# Patient Record
Sex: MALE | Race: WHITE | NOT HISPANIC OR LATINO | Employment: OTHER | URBAN - METROPOLITAN AREA
[De-identification: names, ages, dates, MRNs, and addresses within clinical notes are randomized per-mention and may not be internally consistent; named-entity substitution may affect disease eponyms.]

---

## 2017-01-30 ENCOUNTER — ANESTHESIA EVENT (OUTPATIENT)
Dept: PERIOP | Facility: HOSPITAL | Age: 82
End: 2017-01-30
Payer: MEDICARE

## 2017-01-31 ENCOUNTER — ANESTHESIA (OUTPATIENT)
Dept: PERIOP | Facility: HOSPITAL | Age: 82
End: 2017-01-31
Payer: MEDICARE

## 2017-01-31 ENCOUNTER — HOSPITAL ENCOUNTER (OUTPATIENT)
Facility: HOSPITAL | Age: 82
Setting detail: OUTPATIENT SURGERY
Discharge: HOME | End: 2017-01-31
Attending: UROLOGY | Admitting: UROLOGY
Payer: MEDICARE

## 2017-01-31 VITALS
TEMPERATURE: 97.8 F | OXYGEN SATURATION: 96 % | HEART RATE: 58 BPM | RESPIRATION RATE: 18 BRPM | SYSTOLIC BLOOD PRESSURE: 152 MMHG | DIASTOLIC BLOOD PRESSURE: 71 MMHG

## 2017-01-31 RX ORDER — DEXAMETHASONE SODIUM PHOSPHATE 4 MG/ML
INJECTION, SOLUTION INTRA-ARTICULAR; INTRALESIONAL; INTRAMUSCULAR; INTRAVENOUS; SOFT TISSUE AS NEEDED
Status: DISCONTINUED | OUTPATIENT
Start: 2017-01-31 | End: 2017-01-31 | Stop reason: SURG

## 2017-01-31 RX ORDER — GLYCINE 1.5 G/100ML
SOLUTION IRRIGATION AS NEEDED
Status: DISCONTINUED | OUTPATIENT
Start: 2017-01-31 | End: 2017-01-31 | Stop reason: HOSPADM

## 2017-01-31 RX ORDER — ONDANSETRON 2 MG/ML
4 INJECTION INTRAMUSCULAR; INTRAVENOUS ONCE
Status: DISCONTINUED | OUTPATIENT
Start: 2017-01-31 | End: 2017-01-31 | Stop reason: HOSPADM

## 2017-01-31 RX ORDER — LIDOCAINE HYDROCHLORIDE 20 MG/ML
INJECTION, SOLUTION EPIDURAL; INFILTRATION; INTRACAUDAL; PERINEURAL AS NEEDED
Status: DISCONTINUED | OUTPATIENT
Start: 2017-01-31 | End: 2017-01-31 | Stop reason: SURG

## 2017-01-31 RX ORDER — MAGNESIUM HYDROXIDE 1200 MG/15ML
LIQUID ORAL AS NEEDED
Status: DISCONTINUED | OUTPATIENT
Start: 2017-01-31 | End: 2017-01-31 | Stop reason: HOSPADM

## 2017-01-31 RX ORDER — ONDANSETRON 2 MG/ML
INJECTION INTRAMUSCULAR; INTRAVENOUS AS NEEDED
Status: DISCONTINUED | OUTPATIENT
Start: 2017-01-31 | End: 2017-01-31 | Stop reason: SURG

## 2017-01-31 RX ORDER — PROPOFOL 10 MG/ML
INJECTION, EMULSION INTRAVENOUS AS NEEDED
Status: DISCONTINUED | OUTPATIENT
Start: 2017-01-31 | End: 2017-01-31 | Stop reason: SURG

## 2017-01-31 RX ORDER — SODIUM CHLORIDE, SODIUM LACTATE, POTASSIUM CHLORIDE, CALCIUM CHLORIDE 600; 310; 30; 20 MG/100ML; MG/100ML; MG/100ML; MG/100ML
100 INJECTION, SOLUTION INTRAVENOUS CONTINUOUS
Status: DISCONTINUED | OUTPATIENT
Start: 2017-01-31 | End: 2017-01-31 | Stop reason: HOSPADM

## 2017-01-31 RX ORDER — FENTANYL CITRATE 50 UG/ML
INJECTION, SOLUTION INTRAMUSCULAR; INTRAVENOUS AS NEEDED
Status: DISCONTINUED | OUTPATIENT
Start: 2017-01-31 | End: 2017-01-31 | Stop reason: SURG

## 2017-01-31 RX ORDER — METOPROLOL SUCCINATE 25 MG/1
25 TABLET, EXTENDED RELEASE ORAL DAILY
Status: DISCONTINUED | OUTPATIENT
Start: 2017-01-31 | End: 2017-01-31 | Stop reason: HOSPADM

## 2017-01-31 RX ORDER — FENTANYL CITRATE/PF 50 MCG/ML
50 SYRINGE (ML) INJECTION
Status: DISCONTINUED | OUTPATIENT
Start: 2017-01-31 | End: 2017-01-31 | Stop reason: HOSPADM

## 2017-01-31 RX ADMIN — FENTANYL CITRATE 50 MCG: 50 INJECTION, SOLUTION INTRAMUSCULAR; INTRAVENOUS at 07:42

## 2017-01-31 RX ADMIN — DEXAMETHASONE SODIUM PHOSPHATE 4 MG: 4 INJECTION, SOLUTION INTRA-ARTICULAR; INTRALESIONAL; INTRAMUSCULAR; INTRAVENOUS; SOFT TISSUE at 07:52

## 2017-01-31 RX ADMIN — SODIUM CHLORIDE, SODIUM LACTATE, POTASSIUM CHLORIDE, AND CALCIUM CHLORIDE: .6; .31; .03; .02 INJECTION, SOLUTION INTRAVENOUS at 07:38

## 2017-01-31 RX ADMIN — PROPOFOL 150 MG: 10 INJECTION, EMULSION INTRAVENOUS at 07:46

## 2017-01-31 RX ADMIN — FENTANYL CITRATE 50 MCG: 50 INJECTION, SOLUTION INTRAMUSCULAR; INTRAVENOUS at 07:46

## 2017-01-31 RX ADMIN — ONDANSETRON 4 MG: 2 INJECTION INTRAMUSCULAR; INTRAVENOUS at 08:18

## 2017-01-31 RX ADMIN — LIDOCAINE HYDROCHLORIDE 40 MG: 20 INJECTION, SOLUTION EPIDURAL; INFILTRATION; INTRACAUDAL; PERINEURAL at 07:46

## 2017-01-31 RX ADMIN — CEFAZOLIN SODIUM 1000 MG: 1 SOLUTION INTRAVENOUS at 07:41

## 2017-07-19 ENCOUNTER — TRANSCRIBE ORDERS (OUTPATIENT)
Dept: ADMINISTRATIVE | Facility: HOSPITAL | Age: 82
End: 2017-07-19

## 2017-07-19 ENCOUNTER — APPOINTMENT (OUTPATIENT)
Dept: LAB | Facility: HOSPITAL | Age: 82
End: 2017-07-19
Attending: UROLOGY
Payer: MEDICARE

## 2017-07-19 ENCOUNTER — APPOINTMENT (OUTPATIENT)
Dept: PREADMISSION TESTING | Facility: HOSPITAL | Age: 82
End: 2017-07-19
Payer: MEDICARE

## 2017-07-19 ENCOUNTER — OFFICE VISIT (OUTPATIENT)
Dept: LAB | Facility: HOSPITAL | Age: 82
End: 2017-07-19
Attending: UROLOGY
Payer: MEDICARE

## 2017-07-19 VITALS — BODY MASS INDEX: 29.73 KG/M2 | WEIGHT: 185 LBS | HEIGHT: 66 IN

## 2017-07-19 DIAGNOSIS — Z01.818 PRE-OP TESTING: ICD-10-CM

## 2017-07-19 DIAGNOSIS — Z01.818 PRE-OP TESTING: Primary | ICD-10-CM

## 2017-07-19 LAB
ALBUMIN SERPL BCP-MCNC: 3.8 G/DL (ref 3.5–5)
ALP SERPL-CCNC: 81 U/L (ref 46–116)
ALT SERPL W P-5'-P-CCNC: 23 U/L (ref 12–78)
ANION GAP SERPL CALCULATED.3IONS-SCNC: 9 MMOL/L (ref 4–13)
AST SERPL W P-5'-P-CCNC: 23 U/L (ref 5–45)
BASOPHILS # BLD AUTO: 0 THOUSANDS/ΜL (ref 0–0.1)
BASOPHILS NFR BLD AUTO: 0 % (ref 0–1)
BILIRUB SERPL-MCNC: 0.6 MG/DL (ref 0.2–1)
BILIRUB UR QL STRIP: NEGATIVE
BUN SERPL-MCNC: 22 MG/DL (ref 5–25)
CALCIUM SERPL-MCNC: 9 MG/DL (ref 8.3–10.1)
CHLORIDE SERPL-SCNC: 103 MMOL/L (ref 100–108)
CLARITY UR: CLEAR
CO2 SERPL-SCNC: 28 MMOL/L (ref 21–32)
COLOR UR: YELLOW
CREAT SERPL-MCNC: 1.42 MG/DL (ref 0.6–1.3)
EOSINOPHIL # BLD AUTO: 0.7 THOUSAND/ΜL (ref 0–0.61)
EOSINOPHIL NFR BLD AUTO: 12 % (ref 0–6)
ERYTHROCYTE [DISTWIDTH] IN BLOOD BY AUTOMATED COUNT: 13.4 % (ref 11.6–15.1)
GFR SERPL CREATININE-BSD FRML MDRD: 47.3 ML/MIN/1.73SQ M
GLUCOSE P FAST SERPL-MCNC: 98 MG/DL (ref 65–99)
GLUCOSE UR STRIP-MCNC: NEGATIVE MG/DL
HCT VFR BLD AUTO: 39.3 % (ref 42–52)
HGB BLD-MCNC: 13 G/DL (ref 14–18)
HGB UR QL STRIP.AUTO: NEGATIVE
KETONES UR STRIP-MCNC: NEGATIVE MG/DL
LEUKOCYTE ESTERASE UR QL STRIP: NEGATIVE
LYMPHOCYTES # BLD AUTO: 0.9 THOUSANDS/ΜL (ref 0.6–4.47)
LYMPHOCYTES NFR BLD AUTO: 16 % (ref 14–44)
MCH RBC QN AUTO: 31.4 PG (ref 27–31)
MCHC RBC AUTO-ENTMCNC: 33.2 G/DL (ref 31.4–37.4)
MCV RBC AUTO: 95 FL (ref 82–98)
MONOCYTES # BLD AUTO: 0.5 THOUSAND/ΜL (ref 0.17–1.22)
MONOCYTES NFR BLD AUTO: 9 % (ref 4–12)
NEUTROPHILS # BLD AUTO: 3.6 THOUSANDS/ΜL (ref 1.85–7.62)
NEUTS SEG NFR BLD AUTO: 63 % (ref 43–75)
NITRITE UR QL STRIP: NEGATIVE
NRBC BLD AUTO-RTO: 0 /100 WBCS
PH UR STRIP.AUTO: 7.5 [PH] (ref 5–9)
PLATELET # BLD AUTO: 121 THOUSANDS/UL (ref 130–400)
PMV BLD AUTO: 9.3 FL (ref 8.9–12.7)
POTASSIUM SERPL-SCNC: 4.4 MMOL/L (ref 3.5–5.3)
PROT SERPL-MCNC: 7.6 G/DL (ref 6.4–8.2)
PROT UR STRIP-MCNC: NEGATIVE MG/DL
RBC # BLD AUTO: 4.16 MILLION/UL (ref 4.7–6.1)
SODIUM SERPL-SCNC: 140 MMOL/L (ref 136–145)
SP GR UR STRIP.AUTO: 1.01 (ref 1–1.03)
UROBILINOGEN UR QL STRIP.AUTO: 0.2 E.U./DL
WBC # BLD AUTO: 5.7 THOUSAND/UL (ref 4.8–10.8)

## 2017-07-19 PROCEDURE — 87086 URINE CULTURE/COLONY COUNT: CPT

## 2017-07-19 PROCEDURE — 85025 COMPLETE CBC W/AUTO DIFF WBC: CPT | Performed by: UROLOGY

## 2017-07-19 PROCEDURE — 36415 COLL VENOUS BLD VENIPUNCTURE: CPT | Performed by: UROLOGY

## 2017-07-19 PROCEDURE — 80053 COMPREHEN METABOLIC PANEL: CPT | Performed by: UROLOGY

## 2017-07-19 PROCEDURE — 93005 ELECTROCARDIOGRAM TRACING: CPT

## 2017-07-19 PROCEDURE — 81003 URINALYSIS AUTO W/O SCOPE: CPT | Performed by: UROLOGY

## 2017-07-19 RX ORDER — BRIMONIDINE TARTRATE, TIMOLOL MALEATE 2; 5 MG/ML; MG/ML
1 SOLUTION/ DROPS OPHTHALMIC EVERY 12 HOURS SCHEDULED
COMMUNITY
End: 2019-07-24

## 2017-07-20 LAB
ATRIAL RATE: 57 BPM
BACTERIA UR CULT: NORMAL
P AXIS: 65 DEGREES
PR INTERVAL: 188 MS
QRS AXIS: 25 DEGREES
QRSD INTERVAL: 78 MS
QT INTERVAL: 438 MS
QTC INTERVAL: 426 MS
T WAVE AXIS: 84 DEGREES
VENTRICULAR RATE: 57 BPM

## 2017-07-24 ENCOUNTER — ANESTHESIA EVENT (OUTPATIENT)
Dept: PERIOP | Facility: HOSPITAL | Age: 82
End: 2017-07-24
Payer: MEDICARE

## 2017-07-25 ENCOUNTER — ANESTHESIA (OUTPATIENT)
Dept: PERIOP | Facility: HOSPITAL | Age: 82
End: 2017-07-25
Payer: MEDICARE

## 2017-07-25 ENCOUNTER — HOSPITAL ENCOUNTER (OUTPATIENT)
Facility: HOSPITAL | Age: 82
Setting detail: OUTPATIENT SURGERY
Discharge: HOME | End: 2017-07-25
Attending: UROLOGY | Admitting: UROLOGY
Payer: MEDICARE

## 2017-07-25 VITALS
DIASTOLIC BLOOD PRESSURE: 78 MMHG | HEART RATE: 58 BPM | RESPIRATION RATE: 16 BRPM | TEMPERATURE: 97.6 F | OXYGEN SATURATION: 97 % | SYSTOLIC BLOOD PRESSURE: 166 MMHG

## 2017-07-25 PROBLEM — C67.8 MALIGNANT NEOPLASM OF OVERLAPPING SITES OF BLADDER (HCC): Status: ACTIVE | Noted: 2017-07-25

## 2017-07-25 RX ORDER — GLYCINE 1.5 G/100ML
SOLUTION IRRIGATION AS NEEDED
Status: DISCONTINUED | OUTPATIENT
Start: 2017-07-25 | End: 2017-07-25 | Stop reason: HOSPADM

## 2017-07-25 RX ORDER — FENTANYL CITRATE 50 UG/ML
INJECTION, SOLUTION INTRAMUSCULAR; INTRAVENOUS AS NEEDED
Status: DISCONTINUED | OUTPATIENT
Start: 2017-07-25 | End: 2017-07-25 | Stop reason: SURG

## 2017-07-25 RX ORDER — SODIUM CHLORIDE, SODIUM LACTATE, POTASSIUM CHLORIDE, CALCIUM CHLORIDE 600; 310; 30; 20 MG/100ML; MG/100ML; MG/100ML; MG/100ML
75 INJECTION, SOLUTION INTRAVENOUS CONTINUOUS
Status: DISCONTINUED | OUTPATIENT
Start: 2017-07-25 | End: 2017-07-25 | Stop reason: HOSPADM

## 2017-07-25 RX ORDER — FENTANYL CITRATE/PF 50 MCG/ML
25 SYRINGE (ML) INJECTION
Status: DISCONTINUED | OUTPATIENT
Start: 2017-07-25 | End: 2017-07-25 | Stop reason: HOSPADM

## 2017-07-25 RX ORDER — PROPOFOL 10 MG/ML
INJECTION, EMULSION INTRAVENOUS AS NEEDED
Status: DISCONTINUED | OUTPATIENT
Start: 2017-07-25 | End: 2017-07-25 | Stop reason: SURG

## 2017-07-25 RX ORDER — ONDANSETRON 2 MG/ML
4 INJECTION INTRAMUSCULAR; INTRAVENOUS ONCE AS NEEDED
Status: DISCONTINUED | OUTPATIENT
Start: 2017-07-25 | End: 2017-07-25 | Stop reason: HOSPADM

## 2017-07-25 RX ORDER — CEFOXITIN 1 G/1
INJECTION, POWDER, FOR SOLUTION INTRAVENOUS AS NEEDED
Status: DISCONTINUED | OUTPATIENT
Start: 2017-07-25 | End: 2017-07-25 | Stop reason: SURG

## 2017-07-25 RX ORDER — SODIUM CHLORIDE, SODIUM LACTATE, POTASSIUM CHLORIDE, CALCIUM CHLORIDE 600; 310; 30; 20 MG/100ML; MG/100ML; MG/100ML; MG/100ML
100 INJECTION, SOLUTION INTRAVENOUS CONTINUOUS
Status: DISCONTINUED | OUTPATIENT
Start: 2017-07-25 | End: 2017-07-25 | Stop reason: HOSPADM

## 2017-07-25 RX ADMIN — FENTANYL CITRATE 50 MCG: 50 INJECTION, SOLUTION INTRAMUSCULAR; INTRAVENOUS at 07:46

## 2017-07-25 RX ADMIN — FENTANYL CITRATE 50 MCG: 50 INJECTION, SOLUTION INTRAMUSCULAR; INTRAVENOUS at 07:55

## 2017-07-25 RX ADMIN — FENTANYL CITRATE 50 MCG: 50 INJECTION, SOLUTION INTRAMUSCULAR; INTRAVENOUS at 07:50

## 2017-07-25 RX ADMIN — CEFOXITIN SODIUM 1000 MG: 1 POWDER, FOR SOLUTION INTRAVENOUS at 07:38

## 2017-07-25 RX ADMIN — SODIUM CHLORIDE, SODIUM LACTATE, POTASSIUM CHLORIDE, AND CALCIUM CHLORIDE 75 ML/HR: .6; .31; .03; .02 INJECTION, SOLUTION INTRAVENOUS at 07:07

## 2017-07-25 RX ADMIN — PROPOFOL 200 MG: 10 INJECTION, EMULSION INTRAVENOUS at 07:44

## 2017-07-25 RX ADMIN — SODIUM CHLORIDE, SODIUM LACTATE, POTASSIUM CHLORIDE, AND CALCIUM CHLORIDE 100 ML/HR: .6; .31; .03; .02 INJECTION, SOLUTION INTRAVENOUS at 09:59

## 2018-07-25 NOTE — H&P
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  Cystoscopy 7/23/18 indicated bladder neck and trigone reoccurrence  Large and flat  Prior BCG and mitomycin reaction   · Transurethral resection of the bladder tumors and instillation of epirubicin  · The risks, benefits, alternatives,and probabilities of success were discussed in detail with no guarantee made as to outcome  All questions were answered to the patient's satisfaction  · Two hour dwell time  HISTORY OF PRESENT ILLNESS:  81 y/o male with a history of recurrent urothelial carcinoma of his bladder and urethra recently underwent cystoscopy indicating large flat tumors  Presents for surgical resection of the lesions with subsequent bladder instillation with epirubicin       PAST MEDICAL HISTORY:  Past Medical History:   Diagnosis Date    Arthritis     Bladder cancer [facility])     also has prostate ca, skin ca    Blind right eye     S/P cataract surgery-2/15    [facility] )     Bladder, prostate and skin    COPD (chronic obstructive pulmonary disease) ([facility] )     Coronary artery disease     CABG, Right carotid endarterectomy    Eye problem     developed post op complicationsafter cataract extraction 2/2016-had 2 procedures in [facility] but not able to save the vision, uses  an eye patch at night         ight    Glaucoma     Hearing difficulty of both ears     bilateral hearing aids    Hyperlipidemia     Hypertension     Macular degeneration     Myocardial infarction 2003    CABG x 4    Polymyalgia ([facility] )        PAST SURGICAL HISTORY:  Past Surgical History:   Procedure Laterality Date    ACHILLES TENDON REPAIR Left     BLADDER INSTILLATION      numerous procedures    CARDIAC SURGERY  2003    CABG x 4    CAROTID ENDARTARECTOMY Right 2001    CATARACT EXTRACTION      aliyah w/ IOL  EYE SURGERY Right     x2 -S/P cataract surgery- now blind in the right eye    FRACTURE SURGERY Right 01/2014    wrist    HEMORRHOID SURGERY      AR CYSTOURETHROSCOPY,FULGUR 2-5 CM LESN N/A 7/25/2017    Procedure: TRANSURETHRAL RESECTION OF BLADDER TUMOR (TURBT), BLADDER INSTILLATION;  Surgeon: Azael Vital MD;  Location: [address] [place] [address];  Service: Urology    AR INSTILL ANTICANCER AGENT IN BLADDER N/A 7/25/2017    Procedure: INSTILLATION EPIRUBICIN;  Surgeon: Azael Vital MD;  Location: [address] [place] [address];  Service: Urology    AR [address] [place] [address] Right 2/16/2016    Procedure: EXTRACTION EXTRACAPSULAR CATARACT PHACO INTRAOCULAR LENS (IOL); Surgeon: Carroll Khan MD;  Location: [facility] OR;  Service: Ophthalmology    AR [address] [place] [address] Left 1/26/2016    Procedure: EXTRACTION INTRACAPSULAR CATARACT PHACO INTRAOCULAR LENS (IOL);   Surgeon: Carroll Khan MD;  Location: [facility] OR;  Service: Ophthalmology    SKIN BIOPSY      face    TRANSURETHRAL RESECTION OF BLADDER TUMOR      TRANSURETHRAL RESECTION OF BLADDER TUMOR N/A 1/31/2017    Procedure: TRANSURETHRAL RESECTION OF URETHRAL LESIONS /  BLADDER TUMOR W/ HOLMIUM LASER, AND URETHRAL TUMOR AND INSTILLATION OF Ronold Pryor;  Surgeon: Azael Vital MD;  Location: [address] [place] [address];  Service:     TRANSURETHRAL RESECTION OF BLADDER TUMOR Bilateral 7/12/2016    Procedure: TRANSURETHRAL RESECTION OF BLADDER TUMOR (TURBT), Cysto, Retrogrades;  Surgeon: Azael Vital MD;  Location: [address] [place] [address];  Service:     TRANSURETHRAL RESECTION OF PROSTATE  02/28/2013    [place] 6 (3+3) involving <5%    WRIST FRACTURE SURGERY Right     has hardware    WRIST FRACTURE SURGERY Right     orif of right wrist - still has hardware in place from 1/2014       ALLERGIES:  Allergies   Allergen Reactions    Bcg Live Other (See Comments)     Shakes, became very ill    Lipitor [Atorvastatin] GI Intolerance     Stomach pain    Xalatan [Latanoprost]      Back ache    Mitomycin Rash    Other Rash     Cat Scan Dye       SOCIAL HISTORY:  History   Alcohol Use    8 4 oz/week    14 Glasses of wine per week     History   Drug Use No     History   Smoking Status    Former Smoker    Years: 30 00    Types: Pipe, Cigars    Quit date: 7/11/1986   Smokeless Tobacco    Never Used     Comment: pipe,cigar       FAMILY HISTORY:  Family History   Problem Relation Age of Onset    Diabetes Father     Heart disease Father     Diabetes Sister     Heart disease Sister        MEDICATIONS:  No current facility-administered medications for this encounter  Current Outpatient Prescriptions:     amLODIPine (NORVASC) 2 5 mg tablet, Take 2 5 mg by mouth daily at bedtime  , Disp: , Rfl:     aspirin 81 MG tablet, Take 81 mg by mouth daily in the early morning Last dose 7/18/17 , Disp: , Rfl:     brimonidine-timolol (COMBIGAN) 0 2-0 5 %, Administer 1 drop into the left eye every 12 (twelve) hours, Disp: , Rfl:     GLUCOSAMINE HCL PO, Take 1 tablet by mouth daily in the early morning , Disp: , Rfl:     metoprolol succinate (TOPROL-XL) 25 mg 24 hr tablet, Take 25 mg by mouth daily in the early morning , Disp: , Rfl:     Multiple Vitamins-Minerals (OCUVITE PO), Take by mouth every morning, Disp: , Rfl:     rosuvastatin (CRESTOR) 20 MG tablet, Take 20 mg by mouth daily at bedtime  , Disp: , Rfl:     Review of Systems    PHYSICAL EXAM:  Physical Exam   Constitutional: He appears well-developed  HENT:   Head: Normocephalic  Pulmonary/Chest: Effort normal    Abdominal: Soft  Neurological: He is alert  Skin: Skin is warm  Psychiatric: He has a normal mood and affect         LAB RESULTS:  Lab Results   Component Value Date    WBC 5 70 07/19/2017    HGB 13 0 (L) 07/19/2017    HCT 39 3 (L) 07/19/2017    MCV 95 07/19/2017     (L) 07/19/2017     Lab Results   Component Value Date    GLUCOSE 92 01/25/2017    CALCIUM 9 0 07/19/2017     07/19/2017    K 4 4 07/19/2017    CO2 28 07/19/2017     07/19/2017    BUN 22 07/19/2017    CREATININE 1 42 (H) 07/19/2017     Lab Results   Component Value Date    CALCIUM 9 0 07/19/2017       Maite Hadley PA-C  07/25/18    Portions of the record may have been created with voice recognition software   Occasional wrong word or "sound alike" substitutions may have occurred due to the inherent limitations of voice recognition software   Read the chart carefully and recognize, using context, where substitutions have occurred

## 2018-07-30 ENCOUNTER — APPOINTMENT (OUTPATIENT)
Dept: PREADMISSION TESTING | Facility: HOSPITAL | Age: 83
End: 2018-07-30
Payer: MEDICARE

## 2018-07-30 ENCOUNTER — APPOINTMENT (OUTPATIENT)
Dept: LAB | Facility: HOSPITAL | Age: 83
End: 2018-07-30
Attending: UROLOGY
Payer: MEDICARE

## 2018-07-30 ENCOUNTER — TRANSCRIBE ORDERS (OUTPATIENT)
Dept: ADMINISTRATIVE | Facility: HOSPITAL | Age: 83
End: 2018-07-30
Payer: MEDICARE

## 2018-07-30 DIAGNOSIS — Z01.818 PREOP EXAMINATION: Primary | ICD-10-CM

## 2018-07-30 DIAGNOSIS — Z01.818 PREOP EXAMINATION: ICD-10-CM

## 2018-07-30 LAB
BILIRUB UR QL STRIP: NEGATIVE
CLARITY UR: CLEAR
COLOR UR: NORMAL
GLUCOSE UR STRIP-MCNC: NEGATIVE MG/DL
HGB UR QL STRIP.AUTO: NEGATIVE
KETONES UR STRIP-MCNC: NEGATIVE MG/DL
LEUKOCYTE ESTERASE UR QL STRIP: NEGATIVE
NITRITE UR QL STRIP: NEGATIVE
PH UR STRIP.AUTO: 7 [PH] (ref 5–9)
PROT UR STRIP-MCNC: NEGATIVE MG/DL
SP GR UR STRIP.AUTO: <=1.005 (ref 1–1.03)
UROBILINOGEN UR QL STRIP.AUTO: 0.2 E.U./DL

## 2018-07-30 PROCEDURE — 81003 URINALYSIS AUTO W/O SCOPE: CPT | Performed by: UROLOGY

## 2018-07-30 PROCEDURE — 87086 URINE CULTURE/COLONY COUNT: CPT

## 2018-07-30 PROCEDURE — 93005 ELECTROCARDIOGRAM TRACING: CPT | Performed by: UROLOGY

## 2018-07-30 NOTE — PRE-PROCEDURE INSTRUCTIONS
My Surgical Experience    The following information was developed to assist you to prepare for your operation  What do I need to do before coming to the hospital?   Arrange for a responsible person to drive you to and from the hospital    Arrange care for your children at home  Children are not allowed in the recovery areas of the hospital   Plan to wear clothing that is easy to put on and take off  If you are having shoulder surgery, wear a shirt that buttons or zippers in the front  Bathing  o Shower the evening before and the morning of your surgery with an antibacterial soap  Please refer to the Pre Op Showering Instructions for Surgery Patients Sheet   o Remove nail polish and all body piercing jewelry  o Do not shave any body part for at least 24 hours before surgery-this includes face, arms, legs and upper body  Food  o Nothing to eat or drink after midnight the night before your surgery  This includes candy and chewing gum  o Exception: If your surgery is after 12:00pm (noon), you may have clear liquids such as 7-Up®, ginger ale, apple or cranberry juice, Jell-O®, water, or clear broth until 8:00 am  o Do not drink milk or juice with pulp on the morning before surgery  o Do not drink alcohol 24 hours before surgery  Medicine  o Follow instructions you received from your surgeon about which medicines you may take on the day of surgery  o If instructed to take medicine on the morning of surgery, take pills with just a small sip of water  Call your prescribing doctor for specific infroamtion on what to do if you take insulin    What should I bring to the hospital?    Bring:  Lindsay Shelling or a walker, if you have them, for foot or knee surgery   A list of the daily medicines, vitamins, minerals, herbals and nutritional supplements you take   Include the dosages of medicines and the time you take them each day   Glasses, dentures or hearing aids   Minimal clothing; you will be wearing hospital sleepwear   Photo ID; required to verify your identity   If you have a Living Will or Power of , bring a copy of the documents   If you have an ostomy, bring an extra pouch and any supplies you use    Do not bring   Medicines or inhalers   Money, valuables or jewelry    What other information should I know about the day of surgery?  Notify your surgeons if you develop a cold, sore throat, cough, fever, rash or any other illness   Report to the Ambulatory Surgical/Same Day Surgery Unit   You will be instructed to stop at Registration only if you have not been pre-registered   Inform your  fi they do not stay that they will be asked by the staff to leave a phone number where they can be reached   Be available to be reached before surgery  In the event the operating room schedule changes, you may be asked to come in earlier or later than expected    *It is important to tell your doctor and others involved in your health care if you are taking or have been taking any non-prescription drugs, vitamins, minerals, herbals or other nutritional supplements  Any of these may interact with some food or medicines and cause a reaction      Pre-Surgery Instructions:   Medication Instructions    amLODIPine (NORVASC) 2 5 mg tablet Instructed patient per Anesthesia Guidelines   aspirin 81 MG tablet Instructed patient per Anesthesia Guidelines   brimonidine-timolol (COMBIGAN) 0 2-0 5 % Instructed patient per Anesthesia Guidelines   GLUCOSAMINE HCL PO Instructed patient per Anesthesia Guidelines   metoprolol succinate (TOPROL-XL) 25 mg 24 hr tablet Instructed patient per Anesthesia Guidelines   Multiple Vitamins-Minerals (OCUVITE PO) Instructed patient per Anesthesia Guidelines   rosuvastatin (CRESTOR) 20 MG tablet Instructed patient per Anesthesia Guidelines  To take metoprolol and use eye drops a m   Of surgery

## 2018-07-31 LAB — BACTERIA UR CULT: NORMAL

## 2018-08-01 LAB
ATRIAL RATE: 60 BPM
P AXIS: 60 DEGREES
PR INTERVAL: 186 MS
QRS AXIS: 14 DEGREES
QRSD INTERVAL: 84 MS
QT INTERVAL: 434 MS
QTC INTERVAL: 434 MS
T WAVE AXIS: 85 DEGREES
VENTRICULAR RATE: 60 BPM

## 2018-08-01 PROCEDURE — 93010 ELECTROCARDIOGRAM REPORT: CPT | Performed by: INTERNAL MEDICINE

## 2018-08-02 ENCOUNTER — ANESTHESIA EVENT (OUTPATIENT)
Dept: PERIOP | Facility: HOSPITAL | Age: 83
End: 2018-08-02
Payer: MEDICARE

## 2018-08-02 NOTE — ANESTHESIA PREPROCEDURE EVALUATION
Review of Systems/Medical History  Patient summary reviewed  Chart reviewed      Cardiovascular  Hyperlipidemia, Hypertension controlled, Past MI , CAD , History of CABG,   Comment: Right carotid endarterectomy,  Pulmonary  COPD ,        GI/Hepatic       Genitourinary malignancy Bladder cancer,        Endo/Other     GYN       Hematology   Musculoskeletal    Comment:  [facility]  Arthritis     Neurology      Comment: Eye problem developed post op complicationsafter cataract extraction 2/2016-had 2 procedures in [place] but not able to save the vision, uses  an eye patch at night         ight  Blind right eye S/P cataract surgery-2/15  Glaucoma Macular degeneration   Hearing difficulty of both ears bilateral hearing aids   Psychology           Physical Exam    Airway    Mallampati score: II  TM Distance: >3 FB  Neck ROM: full     Dental       Cardiovascular  Rhythm: regular, Rate: normal, Cardiovascular exam normal    Pulmonary  Comment: Minimal end expiratory wheezing in b/l lower lobes that improved with coughing, Breath sounds clear to auscultation,     Other Findings  Upper and lower partial plates removed      Anesthesia Plan  ASA Score- 4     Anesthesia Type- general and IV sedation with anesthesia with ASA Monitors  Additional Monitors:   Airway Plan:         Plan Factors-    Induction- intravenous  Postoperative Plan- Plan for postoperative opioid use  Planned trial extubation    Informed Consent- Anesthetic plan and risks discussed with patient and spouse

## 2018-08-03 ENCOUNTER — ANESTHESIA (OUTPATIENT)
Dept: PERIOP | Facility: HOSPITAL | Age: 83
End: 2018-08-03
Payer: MEDICARE

## 2018-08-03 ENCOUNTER — HOSPITAL ENCOUNTER (OUTPATIENT)
Facility: HOSPITAL | Age: 83
Setting detail: OUTPATIENT SURGERY
Discharge: HOME | End: 2018-08-03
Attending: UROLOGY | Admitting: UROLOGY
Payer: MEDICARE

## 2018-08-03 VITALS
SYSTOLIC BLOOD PRESSURE: 162 MMHG | OXYGEN SATURATION: 98 % | DIASTOLIC BLOOD PRESSURE: 68 MMHG | TEMPERATURE: 97.4 F | HEART RATE: 59 BPM | RESPIRATION RATE: 20 BRPM | WEIGHT: 180 LBS | BODY MASS INDEX: 29.05 KG/M2

## 2018-08-03 DIAGNOSIS — C67.8 MALIGNANT NEOPLASM OF OVERLAPPING SITES OF BLADDER (HCC): ICD-10-CM

## 2018-08-03 PROCEDURE — 88305 TISSUE EXAM BY PATHOLOGIST: CPT | Performed by: PATHOLOGY

## 2018-08-03 PROCEDURE — 88342 IMHCHEM/IMCYTCHM 1ST ANTB: CPT | Performed by: PATHOLOGY

## 2018-08-03 PROCEDURE — 88341 IMHCHEM/IMCYTCHM EA ADD ANTB: CPT | Performed by: PATHOLOGY

## 2018-08-03 RX ORDER — PROPOFOL 10 MG/ML
INJECTION, EMULSION INTRAVENOUS AS NEEDED
Status: DISCONTINUED | OUTPATIENT
Start: 2018-08-03 | End: 2018-08-03 | Stop reason: SURG

## 2018-08-03 RX ORDER — LIDOCAINE HYDROCHLORIDE 10 MG/ML
INJECTION, SOLUTION INFILTRATION; PERINEURAL AS NEEDED
Status: DISCONTINUED | OUTPATIENT
Start: 2018-08-03 | End: 2018-08-03 | Stop reason: SURG

## 2018-08-03 RX ORDER — SODIUM CHLORIDE, SODIUM LACTATE, POTASSIUM CHLORIDE, CALCIUM CHLORIDE 600; 310; 30; 20 MG/100ML; MG/100ML; MG/100ML; MG/100ML
100 INJECTION, SOLUTION INTRAVENOUS CONTINUOUS
Status: DISCONTINUED | OUTPATIENT
Start: 2018-08-03 | End: 2018-08-03 | Stop reason: HOSPADM

## 2018-08-03 RX ORDER — SODIUM CHLORIDE, SODIUM LACTATE, POTASSIUM CHLORIDE, CALCIUM CHLORIDE 600; 310; 30; 20 MG/100ML; MG/100ML; MG/100ML; MG/100ML
75 INJECTION, SOLUTION INTRAVENOUS CONTINUOUS
Status: DISCONTINUED | OUTPATIENT
Start: 2018-08-03 | End: 2018-08-03 | Stop reason: SDUPTHER

## 2018-08-03 RX ORDER — CEFAZOLIN SODIUM 1 G/3ML
INJECTION, POWDER, FOR SOLUTION INTRAMUSCULAR; INTRAVENOUS AS NEEDED
Status: DISCONTINUED | OUTPATIENT
Start: 2018-08-03 | End: 2018-08-03 | Stop reason: SURG

## 2018-08-03 RX ORDER — FENTANYL CITRATE/PF 50 MCG/ML
25 SYRINGE (ML) INJECTION
Status: DISCONTINUED | OUTPATIENT
Start: 2018-08-03 | End: 2018-08-03 | Stop reason: HOSPADM

## 2018-08-03 RX ORDER — GLYCINE 1.5 G/100ML
SOLUTION IRRIGATION AS NEEDED
Status: DISCONTINUED | OUTPATIENT
Start: 2018-08-03 | End: 2018-08-03 | Stop reason: HOSPADM

## 2018-08-03 RX ORDER — ONDANSETRON 2 MG/ML
4 INJECTION INTRAMUSCULAR; INTRAVENOUS ONCE AS NEEDED
Status: DISCONTINUED | OUTPATIENT
Start: 2018-08-03 | End: 2018-08-03 | Stop reason: HOSPADM

## 2018-08-03 RX ORDER — PROPOFOL 10 MG/ML
INJECTION, EMULSION INTRAVENOUS CONTINUOUS PRN
Status: DISCONTINUED | OUTPATIENT
Start: 2018-08-03 | End: 2018-08-03 | Stop reason: SURG

## 2018-08-03 RX ORDER — PROMETHAZINE HYDROCHLORIDE 25 MG/ML
6.25 INJECTION, SOLUTION INTRAMUSCULAR; INTRAVENOUS ONCE AS NEEDED
Status: DISCONTINUED | OUTPATIENT
Start: 2018-08-03 | End: 2018-08-03 | Stop reason: HOSPADM

## 2018-08-03 RX ADMIN — PROPOFOL 50 MG: 10 INJECTION, EMULSION INTRAVENOUS at 07:50

## 2018-08-03 RX ADMIN — PROPOFOL 100 MCG/KG/MIN: 10 INJECTION, EMULSION INTRAVENOUS at 07:50

## 2018-08-03 RX ADMIN — SODIUM CHLORIDE, SODIUM LACTATE, POTASSIUM CHLORIDE, AND CALCIUM CHLORIDE: .6; .31; .03; .02 INJECTION, SOLUTION INTRAVENOUS at 07:47

## 2018-08-03 RX ADMIN — CEFAZOLIN 1000 MG: 1 INJECTION, POWDER, FOR SOLUTION INTRAVENOUS at 07:45

## 2018-08-03 RX ADMIN — LIDOCAINE HYDROCHLORIDE 50 MG: 10 INJECTION, SOLUTION INFILTRATION; PERINEURAL at 07:50

## 2018-08-03 NOTE — PERIOPERATIVE NURSING NOTE
Indwelling catheter in place with IV tubing and Epirubicin attached   Some leaking around meatus of Epirubicin, Dr Danielle Dewey aware

## 2018-08-03 NOTE — PERIOPERATIVE NURSING NOTE
Dr Aleena Creighton called in [address] now to inform him That patient was received back to [facility] with no chemo  medication coming back into bag and emesis basin full of medication that leaked around catheter  Patients skin around catheter cleansed and protected with water proof pads  Instructed by MD to leave catheter in for now and d/c it at 1030 and discharge pt   To home at that time

## 2018-08-03 NOTE — INTERVAL H&P NOTE
Note reviewed and pt seen and examined with PA  Agree with assessment and plan  Patient seen and examined  No changes in exam of heart and lungs  Consent confirmed

## 2018-08-07 NOTE — OP NOTE
OPERATIVE REPORT- Dr Denisa Coles  PATIENT NAME: Heather Vaughn    :  1930  MRN: [id number]  Pt Location: [facility] OR ROOM 04    SURGERY DATE: 8/3/2018    Surgeon: Senthil Rivera MD    Pre-op Diagnosis:  1  Recurrent bladder cancer    Post-op Diagnosis:  1  Same    Procedure:  1  Transurethral resection of tumor in the trigonal area of the bladder  2  Instillation of epirubicin    Specimen(s):   ID Type Source Tests Collected by Time Destination   1 : Trigone Bladder Tissue Tissue Urinary Bladder TISSUE EXAM Senthil Rivera MD 8/3/2018 0809        Estimated Blood Loss: Minimal    Complications: None    Drains:  1  [de-identified] [facility] Osei    Anesthesia type:   IV Sedation with Anesthesia    Indications for surgery:  1  Recurrent bladder tumor in the trigone area  Numerous prior treatments  Patient intolerant to BCG or mitomycin unfortunately  Findings:  1  Small bladder tumors, fine, papillary in the trigone region of the bladder    Procedure and Technique:   After obtaining consent and indentifying the patient, antibiotics were given as ordered and the patient was brought to the room  All appropriate leads and monitors were placed and the patient was appropriately positioned on the table  Anesthesia was administered and the patient was sterilely prepped and draped  A timeout was performed where the patient name, , procedure, antibiotics, allergies, etc were discussed  All in the room were satisfied before the start of the operative procedure  What follows are the operative findings and events  Cystoscopy was performed  Tumors were identified in roller ball fulgurated  A Osei catheter was then inserted and the epirubicinwas gently instilled through IV tubing into the bladder and the bag was set to drainage on the abdomen in order to drain backwards in case excess pressure buildup over the next 2 hours    The procedure was terminated and the patient was awakened without incident and transferred to the PACU in satisfactory condition  Plan:  1  Hold medicine for 2 hours  Cystoscopy in 3 months  SIGNATURE: Marina Ridley MD  DATE: August 7, 2018  TIME: 1:15 PM    Portions of the record may have been created with voice recognition software   Occasional wrong word or "sound alike" substitutions may have occurred due to the inherent limitations of voice recognition software   Read the chart carefully and recognize, using context, where substitutions have occurred

## 2019-07-19 PROCEDURE — 1124F ACP DISCUSS-NO DSCNMKR DOCD: CPT | Performed by: PATHOLOGY

## 2019-07-19 NOTE — H&P
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  Cystoscopy 06/27/19 indicated cancer reoccurrence along the bladder neck and the left trigone  Prior BCG and mitomycin reaction   · Transurethral resection of the bladder tumors and instillation of gemcitabine 2gm in NS 100mL post op for 1 hour  · The risks, benefits, alternatives,and probabilities of success were discussed in detail with no guarantee made as to outcome  All questions were answered to the patient's satisfaction  HISTORY OF PRESENT ILLNESS:  79 y/o male with a history of recurrent urothelial carcinoma of his bladder and urethra recently underwent cystoscopy indicating cancer reoccurrence  Presents for surgical resection of the lesions with subsequent bladder instillation with gemcitabine      PAST MEDICAL HISTORY:  Past Medical History:   Diagnosis Date    Arthritis     Bladder [facility])     also has prostate ca, skin ca    Blind right eye     S/P cataract surgery-2/15    Cancer ([facility] )     Bladder, prostate and skin    COPD (chronic obstructive pulmonary disease) ([facility] )     Coronary artery disease     CABG, Right carotid endarterectomy    Eye problem     developed post op complicationsafter cataract extraction 2/2016-had 2 procedures in [facility] but not able to save the vision, uses  an eye patch at night         ight    Glaucoma     Hearing difficulty of both ears     bilateral hearing aids    Hyperlipidemia     Hypertension     Macular degeneration     Myocardial infarction ([address] ) 2003    CABG x 4    Polymyalgia ([facility] )        PAST SURGICAL HISTORY:  Past Surgical History:   Procedure Laterality Date    ACHILLES TENDON REPAIR Left     BLADDER INSTILLATION      numerous procedures    CARDIAC SURGERY  2003    CABG x 4    CAROTID ENDARTARECTOMY Right 2001    CATARACT EXTRACTION      aliyah w/ IOL    EYE SURGERY Right     x2 -S/P cataract surgery- now blind in the right eye    FRACTURE SURGERY Right 01/2014    wrist    HEMORRHOID SURGERY      JOINT REPLACEMENT      KNEE ARTHROPLASTY Right 11/2017    RI CYSTOURETHROSCOPY,FULGUR 2-5 CM LESN N/A 7/25/2017    Procedure: TRANSURETHRAL RESECTION OF BLADDER TUMOR (TURBT), BLADDER INSTILLATION;  Surgeon: Man Rivera MD;  Location: [address] [place] [address];  Service: Urology    RI CYSTOURETHROSCOPY,FULGUR 2-5 CM LESN N/A 8/3/2018    Procedure: CYSTOSCOPY; TURBT;  Surgeon: Man Rivera MD;  Location: [address] [place] [address];  Service: Urology    RI INSTILL ANTICANCER AGENT IN BLADDER N/A 7/25/2017    Procedure: INSTILLATION EPIRUBICIN;  Surgeon: Man Rivera MD;  Location: [address] [place] [address];  Service: Urology    RI IRRIGATION OF BLADDER N/A 8/3/2018    Procedure: EPIRUBICIN INSTILLATION;  Surgeon: Man Rivera MD;  Location: [address] [place] [address];  Service: Urology     [address] Right 2/16/2016    Procedure: EXTRACTION EXTRACAPSULAR CATARACT PHACO INTRAOCULAR LENS (IOL); Surgeon: Yanely Hays MD;  Location: [facility] MAIN OR;  Service: Ophthalmology    RI [address] [place] [address] Left 1/26/2016    Procedure: EXTRACTION INTRACAPSULAR CATARACT PHACO INTRAOCULAR LENS (IOL);   Surgeon: Yanely Hays MD;  Location: [facility] MAIN OR;  Service: Ophthalmology    SKIN BIOPSY      face    TRANSURETHRAL RESECTION OF BLADDER TUMOR      TRANSURETHRAL RESECTION OF BLADDER TUMOR N/A 1/31/2017    Procedure: TRANSURETHRAL RESECTION OF URETHRAL LESIONS /  BLADDER TUMOR W/ HOLMIUM LASER, AND URETHRAL TUMOR AND INSTILLATION OF Tomeka Retort;  Surgeon: Man Rivera MD;  Location: [address] [place] [address];  Service:     TRANSURETHRAL RESECTION OF BLADDER TUMOR Bilateral 7/12/2016    Procedure: TRANSURETHRAL RESECTION OF BLADDER TUMOR (TURBT), Cysto, Retrogrades;  Surgeon: Man Rivera MD;  Location: [facility] MAIN OR;  Service:    [facility] TRANSURETHRAL RESECTION OF PROSTATE  2013    [place] 6 (3+3) involving <5%    WRIST FRACTURE SURGERY Right     has hardware    WRIST FRACTURE SURGERY Right     orif of right wrist - still has hardware in place from 2014       ALLERGIES:  Allergies   Allergen Reactions    Bcg Live Other (See Comments)     Shakes, became very ill    Lipitor [Atorvastatin] GI Intolerance     Stomach pain    Xalatan [Latanoprost]      Back ache    Mitomycin Rash    Other Rash     Cat Scan Dye       SOCIAL HISTORY:  Social History     Substance and Sexual Activity   Alcohol Use Yes    Alcohol/week: 14 0 standard drinks    Types: 14 Glasses of wine per week     Social History     Substance and Sexual Activity   Drug Use No     Social History     Tobacco Use   Smoking Status Former Smoker    Years: 30 00    Types: Pipe, Cigars    Last attempt to quit: 1986    Years since quittin 0   Smokeless Tobacco Never Used   Tobacco Comment    pipe,cigar       FAMILY HISTORY:  Family History   Problem Relation Age of Onset    Diabetes Father     Heart disease Father     Diabetes Sister     Heart disease Sister        MEDICATIONS:  No current facility-administered medications for this encounter       Current Outpatient Medications:     amLODIPine (NORVASC) 2 5 mg tablet, Take 2 5 mg by mouth 2 (two) times a day , Disp: , Rfl:     aspirin 81 MG tablet, Take 81 mg by mouth daily in the early morning Last dose 2818 , Disp: , Rfl:     brimonidine-timolol (COMBIGAN) 0 2-0 5 %, Administer 1 drop into the left eye every 12 (twelve) hours, Disp: , Rfl:     GLUCOSAMINE HCL PO, Take 1 tablet by mouth daily in the early morning , Disp: , Rfl:     metoprolol succinate (TOPROL-XL) 25 mg 24 hr tablet, Take 25 mg by mouth daily in the early morning , Disp: , Rfl:     Multiple Vitamins-Minerals (OCUVITE PO), Take by mouth every morning, Disp: , Rfl:     omeprazole (PriLOSEC) 20 mg delayed release capsule, Take 1 capsule (20 mg total) by mouth daily, Disp: 30 capsule, Rfl: 5    predniSONE 20 mg tablet, Take 1 tablet (20 mg total) by mouth daily 3 daily for 5 days, 2 daily for 3 days, 1 daily for 2 days  , Disp: 23 tablet, Rfl: 0    rosuvastatin (CRESTOR) 20 MG tablet, Take 20 mg by mouth daily at bedtime  , Disp: , Rfl:     Review of Systems    PHYSICAL EXAM:  Physical Exam   Constitutional: He appears well-developed  HENT:   Head: Normocephalic  Pulmonary/Chest: Effort normal    Abdominal: Soft  Neurological: He is alert  Skin: Skin is warm  Psychiatric: He has a normal mood and affect  LAB RESULTS:  Lab Results   Component Value Date    WBC 5 70 07/19/2017    HGB 13 0 (L) 07/19/2017    HCT 39 3 (L) 07/19/2017    MCV 95 07/19/2017     (L) 07/19/2017     Lab Results   Component Value Date    GLUCOSE 135 (H) 11/13/2015    CALCIUM 9 0 07/19/2017     11/13/2015    K 4 4 07/19/2017    CO2 28 07/19/2017     07/19/2017    BUN 22 07/19/2017    CREATININE 1 42 (H) 07/19/2017     Lab Results   Component Value Date    CALCIUM 9 0 07/19/2017       Lorraine Maldonado PA-C  07/19/19    Portions of the record may have been created with voice recognition software   Occasional wrong word or "sound alike" substitutions may have occurred due to the inherent limitations of voice recognition software   Read the chart carefully and recognize, using context, where substitutions have occurred

## 2019-07-24 ENCOUNTER — OFFICE VISIT (OUTPATIENT)
Dept: LAB | Facility: HOSPITAL | Age: 84
End: 2019-07-24
Attending: UROLOGY
Payer: MEDICARE

## 2019-07-24 ENCOUNTER — TRANSCRIBE ORDERS (OUTPATIENT)
Dept: ADMINISTRATIVE | Facility: HOSPITAL | Age: 84
End: 2019-07-24

## 2019-07-24 ENCOUNTER — APPOINTMENT (OUTPATIENT)
Dept: LAB | Facility: HOSPITAL | Age: 84
End: 2019-07-24
Attending: UROLOGY
Payer: MEDICARE

## 2019-07-24 ENCOUNTER — APPOINTMENT (OUTPATIENT)
Dept: PREADMISSION TESTING | Facility: HOSPITAL | Age: 84
End: 2019-07-24
Payer: MEDICARE

## 2019-07-24 DIAGNOSIS — Z01.818 PRE-OP TESTING: ICD-10-CM

## 2019-07-24 DIAGNOSIS — Z01.818 PRE-OP TESTING: Primary | ICD-10-CM

## 2019-07-24 LAB
BILIRUB UR QL STRIP: NEGATIVE
CLARITY UR: CLEAR
COLOR UR: NORMAL
GLUCOSE UR STRIP-MCNC: NEGATIVE MG/DL
HGB UR QL STRIP.AUTO: NEGATIVE
KETONES UR STRIP-MCNC: NEGATIVE MG/DL
LEUKOCYTE ESTERASE UR QL STRIP: NEGATIVE
NITRITE UR QL STRIP: NEGATIVE
PH UR STRIP.AUTO: 7 [PH]
PROT UR STRIP-MCNC: NEGATIVE MG/DL
SP GR UR STRIP.AUTO: 1.01 (ref 1–1.03)
UROBILINOGEN UR QL STRIP.AUTO: 0.2 E.U./DL

## 2019-07-24 PROCEDURE — 87086 URINE CULTURE/COLONY COUNT: CPT

## 2019-07-24 PROCEDURE — 93005 ELECTROCARDIOGRAM TRACING: CPT

## 2019-07-24 PROCEDURE — 81003 URINALYSIS AUTO W/O SCOPE: CPT | Performed by: UROLOGY

## 2019-07-24 RX ORDER — ALBUTEROL SULFATE 90 UG/1
2 AEROSOL, METERED RESPIRATORY (INHALATION) EVERY 6 HOURS PRN
COMMUNITY
Start: 2019-07-22

## 2019-07-24 RX ORDER — ALBUTEROL SULFATE 2.5 MG/3ML
2.5 SOLUTION RESPIRATORY (INHALATION) EVERY 4 HOURS PRN
COMMUNITY

## 2019-07-24 NOTE — PRE-PROCEDURE INSTRUCTIONS
Pre-Surgery Instructions:   Medication Instructions    albuterol (VENTOLIN HFA) 90 mcg/act inhaler Instructed patient per Anesthesia Guidelines   amLODIPine (NORVASC) 2 5 mg tablet Instructed patient per Anesthesia Guidelines   aspirin 81 MG tablet Patient was instructed by Physician and understands   Brinzolamide-Brimonidine (SIMBRINZA) 1-0 2 % SUSP Instructed patient per Anesthesia Guidelines   GLUCOSAMINE HCL PO Instructed patient per Anesthesia Guidelines   Multiple Vitamins-Minerals (PRESERVISION AREDS 2 PO) Instructed patient per Anesthesia Guidelines   rosuvastatin (CRESTOR) 20 MG tablet Instructed patient per Anesthesia Guidelines  Pre op instructions given  Pt to take amlodipine,simbrinza,may use albuterol nebulizer/inhaler the am of surgery   wife Link Lenz [phone] Surgical Experience    The following information was developed to assist you to prepare for your operation  What do I need to do before coming to the hospital?   Arrange for a responsible person to drive you to and from the hospital    Arrange care for your children at home  Children are not allowed in the recovery areas of the hospital   Plan to wear clothing that is easy to put on and take off  If you are having shoulder surgery, wear a shirt that buttons or zippers in the front  Bathing  o Shower the evening before and the morning of your surgery with an antibacterial soap  Please refer to the Pre Op Showering Instructions for Surgery Patients Sheet   o Remove nail polish and all body piercing jewelry  o Do not shave any body part for at least 24 hours before surgery-this includes face, arms, legs and upper body  Food  o Nothing to eat or drink after midnight the night before your surgery   This includes candy and chewing gum  o Exception: If your surgery is after 12:00pm (noon), you may have clear liquids such as 7-Up®, ginger ale, apple or cranberry juice, Jell-O®, water, or clear broth until 8:00 am  o Do not drink milk or juice with pulp on the morning before surgery  o Do not drink alcohol 24 hours before surgery  Medicine  o Follow instructions you received from your surgeon about which medicines you may take on the day of surgery  o If instructed to take medicine on the morning of surgery, take pills with just a small sip of water  Call your prescribing doctor for specific infroamtion on what to do if you take insulin    What should I bring to the hospital?    Bring:  Larene Merl or a walker, if you have them, for foot or knee surgery   A list of the daily medicines, vitamins, minerals, herbals and nutritional supplements you take  Include the dosages of medicines and the time you take them each day   Glasses, dentures or hearing aids   Minimal clothing; you will be wearing hospital sleepwear   Photo ID; required to verify your identity   If you have a Living Will or Power of , bring a copy of the documents   If you have an ostomy, bring an extra pouch and any supplies you use    Do not bring   Medicines or inhalers   Money, valuables or jewelry    What other information should I know about the day of surgery?  Notify your surgeons if you develop a cold, sore throat, cough, fever, rash or any other illness   Report to the Ambulatory Surgical/Same Day Surgery Unit   You will be instructed to stop at Registration only if you have not been pre-registered   Inform your  fi they do not stay that they will be asked by the staff to leave a phone number where they can be reached   Be available to be reached before surgery  In the event the operating room schedule changes, you may be asked to come in earlier or later than expected    *It is important to tell your doctor and others involved in your health care if you are taking or have been taking any non-prescription drugs, vitamins, minerals, herbals or other nutritional supplements   Any of these may interact with some food or medicines and cause a reaction

## 2019-07-25 LAB — BACTERIA UR CULT: ABNORMAL

## 2019-07-27 LAB
ATRIAL RATE: 69 BPM
P AXIS: 68 DEGREES
PR INTERVAL: 188 MS
QRS AXIS: 30 DEGREES
QRSD INTERVAL: 80 MS
QT INTERVAL: 430 MS
QTC INTERVAL: 460 MS
T WAVE AXIS: 83 DEGREES
VENTRICULAR RATE: 69 BPM

## 2019-07-27 PROCEDURE — 93010 ELECTROCARDIOGRAM REPORT: CPT | Performed by: INTERNAL MEDICINE

## 2019-07-29 ENCOUNTER — ANESTHESIA EVENT (OUTPATIENT)
Dept: PERIOP | Facility: HOSPITAL | Age: 84
End: 2019-07-29
Payer: MEDICARE

## 2019-07-30 ENCOUNTER — ANESTHESIA (OUTPATIENT)
Dept: PERIOP | Facility: HOSPITAL | Age: 84
End: 2019-07-30
Payer: MEDICARE

## 2019-07-30 ENCOUNTER — HOSPITAL ENCOUNTER (OUTPATIENT)
Facility: HOSPITAL | Age: 84
Setting detail: OUTPATIENT SURGERY
Discharge: HOME | End: 2019-07-30
Attending: UROLOGY | Admitting: UROLOGY
Payer: MEDICARE

## 2019-07-30 VITALS
OXYGEN SATURATION: 96 % | TEMPERATURE: 97.6 F | BODY MASS INDEX: 30.53 KG/M2 | SYSTOLIC BLOOD PRESSURE: 161 MMHG | HEIGHT: 66 IN | DIASTOLIC BLOOD PRESSURE: 76 MMHG | WEIGHT: 190 LBS | HEART RATE: 65 BPM | RESPIRATION RATE: 16 BRPM

## 2019-07-30 DIAGNOSIS — C67.9 MALIGNANT NEOPLASM OF BLADDER (HCC): ICD-10-CM

## 2019-07-30 PROCEDURE — 88305 TISSUE EXAM BY PATHOLOGIST: CPT | Performed by: PATHOLOGY

## 2019-07-30 PROCEDURE — 88342 IMHCHEM/IMCYTCHM 1ST ANTB: CPT | Performed by: PATHOLOGY

## 2019-07-30 PROCEDURE — 88341 IMHCHEM/IMCYTCHM EA ADD ANTB: CPT | Performed by: PATHOLOGY

## 2019-07-30 RX ORDER — ONDANSETRON 2 MG/ML
4 INJECTION INTRAMUSCULAR; INTRAVENOUS ONCE AS NEEDED
Status: DISCONTINUED | OUTPATIENT
Start: 2019-07-30 | End: 2019-07-30 | Stop reason: HOSPADM

## 2019-07-30 RX ORDER — MAGNESIUM HYDROXIDE 1200 MG/15ML
LIQUID ORAL AS NEEDED
Status: DISCONTINUED | OUTPATIENT
Start: 2019-07-30 | End: 2019-07-30 | Stop reason: HOSPADM

## 2019-07-30 RX ORDER — SODIUM CHLORIDE, SODIUM LACTATE, POTASSIUM CHLORIDE, CALCIUM CHLORIDE 600; 310; 30; 20 MG/100ML; MG/100ML; MG/100ML; MG/100ML
100 INJECTION, SOLUTION INTRAVENOUS CONTINUOUS
Status: DISCONTINUED | OUTPATIENT
Start: 2019-07-30 | End: 2019-07-30 | Stop reason: HOSPADM

## 2019-07-30 RX ORDER — FENTANYL CITRATE/PF 50 MCG/ML
50 SYRINGE (ML) INJECTION
Status: DISCONTINUED | OUTPATIENT
Start: 2019-07-30 | End: 2019-07-30 | Stop reason: HOSPADM

## 2019-07-30 RX ORDER — GLYCINE 1.5 G/100ML
SOLUTION IRRIGATION AS NEEDED
Status: DISCONTINUED | OUTPATIENT
Start: 2019-07-30 | End: 2019-07-30 | Stop reason: HOSPADM

## 2019-07-30 RX ORDER — CEFAZOLIN SODIUM 2 G/50ML
2000 SOLUTION INTRAVENOUS ONCE
Status: DISCONTINUED | OUTPATIENT
Start: 2019-07-30 | End: 2019-07-30 | Stop reason: HOSPADM

## 2019-07-30 RX ORDER — CEFAZOLIN SODIUM 2 G/50ML
SOLUTION INTRAVENOUS AS NEEDED
Status: DISCONTINUED | OUTPATIENT
Start: 2019-07-30 | End: 2019-07-30 | Stop reason: SURG

## 2019-07-30 RX ORDER — FENTANYL CITRATE 50 UG/ML
INJECTION, SOLUTION INTRAMUSCULAR; INTRAVENOUS AS NEEDED
Status: DISCONTINUED | OUTPATIENT
Start: 2019-07-30 | End: 2019-07-30 | Stop reason: SURG

## 2019-07-30 RX ORDER — PROPOFOL 10 MG/ML
INJECTION, EMULSION INTRAVENOUS CONTINUOUS PRN
Status: DISCONTINUED | OUTPATIENT
Start: 2019-07-30 | End: 2019-07-30 | Stop reason: SURG

## 2019-07-30 RX ORDER — SODIUM CHLORIDE, SODIUM LACTATE, POTASSIUM CHLORIDE, CALCIUM CHLORIDE 600; 310; 30; 20 MG/100ML; MG/100ML; MG/100ML; MG/100ML
100 INJECTION, SOLUTION INTRAVENOUS CONTINUOUS
Status: DISCONTINUED | OUTPATIENT
Start: 2019-07-30 | End: 2019-07-30 | Stop reason: SDUPTHER

## 2019-07-30 RX ADMIN — CEFAZOLIN SODIUM 2000 MG: 2 SOLUTION INTRAVENOUS at 10:14

## 2019-07-30 RX ADMIN — PROPOFOL 100 MCG/KG/MIN: 10 INJECTION, EMULSION INTRAVENOUS at 10:17

## 2019-07-30 RX ADMIN — FENTANYL CITRATE 50 MCG: 50 INJECTION, SOLUTION INTRAMUSCULAR; INTRAVENOUS at 10:17

## 2019-07-30 RX ADMIN — FENTANYL CITRATE 50 MCG: 50 INJECTION, SOLUTION INTRAMUSCULAR; INTRAVENOUS at 10:21

## 2019-07-30 RX ADMIN — SODIUM CHLORIDE, SODIUM LACTATE, POTASSIUM CHLORIDE, AND CALCIUM CHLORIDE: .6; .31; .03; .02 INJECTION, SOLUTION INTRAVENOUS at 09:29

## 2019-07-30 RX ADMIN — SODIUM CHLORIDE, SODIUM LACTATE, POTASSIUM CHLORIDE, AND CALCIUM CHLORIDE 100 ML/HR: .6; .31; .03; .02 INJECTION, SOLUTION INTRAVENOUS at 08:35

## 2019-07-30 NOTE — PERIOPERATIVE NURSING NOTE
Assisted  To bathroom, voided moderate amount of light pink urine, denies any pain  Taking fluids by mouth  Wife at bedside

## 2019-07-30 NOTE — ANESTHESIA POSTPROCEDURE EVALUATION
Post-Op Assessment Note    CV Status:  Stable  Pain Score: 0    Pain management: satisfactory to patient     Mental Status:  Alert   Hydration Status:  Stable   PONV Controlled:  Controlled   Airway Patency:  Patent   Post Op Vitals Reviewed: Yes      Staff: CRNA           BP   133/96   Temp      Pulse     Resp 20   SpO2 96

## 2019-07-30 NOTE — PERIOPERATIVE NURSING NOTE
Assisted to bathroom, pt  Has urgency to urinate, leaking urine  Wife educated on hand washing importance  Complete discharge instructions reviewed

## 2019-07-30 NOTE — OP NOTE
OPERATIVE REPORT  PATIENT NAME: Onesimo Ha    :  1930  MRN: [id number]  Pt Location: [facility] OR ROOM 04    SURGERY DATE: 2019    Surgeon(s) and Role:     * Olga Graves MD - Primary    Preop Diagnosis:  Malignant neoplasm of bladder ([address] ) [C67 9]    Post-Op Diagnosis Codes:     * Malignant neoplasm of bladder ([address] ) [C67 9]    Procedure(s) (LRB):  TRANSURETHRAL RESECTION OF BLADDER TUMOR (TURBT), INSTALLATION OF GEMCITABINE (N/A)    Specimen(s):  ID Type Source Tests Collected by Time Destination   1 : send tumor for biopsy as directed Tissue Urinary Bladder TISSUE EXAM Olga Graves MD 2019 1048        Estimated Blood Loss:   Minimal    Drains:  [de-identified] [place] Nieto    Anesthesia Type:   IV Sedation with Anesthesia    Operative Indications:  Malignant neoplasm of bladder ([facility] ) [C67 9]    Operative Findings:  MANY SUPERFICIAL LESIONS OF TRIGONE AND BLADDER NECK  Complications:   None    Procedure and Technique:  After obtaining consent, the patient was identified and brought to the operating room  He was given sedation and placed in proper position  He was sterilely prepped and draped and a timeout was performed  Cystoscopy was performed with the 30 degree lens  Tumors were found in the prostatic urethra and bladder neck  Samples were taken with the flexible biopsy forceps and then cauterized with a bugbee at the base of biopsy and anywhere there was residual tumor  No bleeding was visible at the end  Nieto was placed and inflated  Bladder was drained  Egress to nieto bag was blocked with a hemostat and the infusing tubing was connected to the sampling port  Medicine was left in at 20-30 cm water pressure for 1 hour  Patient Disposition:  pacu with catheter      SIGNATURE: Olga Graves MD  DATE: 2019  TIME: 11:11 AM

## 2019-07-30 NOTE — DISCHARGE INSTRUCTIONS
Call if you experience fevers, chills, severe pain in the side, or significant bleeding  Drink lots of liquids today

## 2019-07-30 NOTE — PERIOPERATIVE NURSING NOTE
Osei catheter removed, chemo solution drained into IV med bag  Osei and med bag packaged in chemo safety bag and sent to pharmacy

## 2019-07-30 NOTE — ANESTHESIA PREPROCEDURE EVALUATION
Review of Systems/Medical History  Patient summary reviewed        Cardiovascular  EKG reviewed, Hyperlipidemia, Hypertension well controlled, Past MI , CAD ,    Pulmonary  COPD mild ,        GI/Hepatic            Endo/Other    Obesity    GYN       Hematology   Musculoskeletal    Arthritis     Neurology  Negative neurology ROS      Psychology   Negative psychology ROS              Physical Exam    Airway    Mallampati score: II  TM Distance: >3 FB  Neck ROM: full     Dental   Comment: No loose,     Cardiovascular  Rhythm: regular, Rate: normal,     Pulmonary  Breath sounds clear to auscultation,     Other Findings        Anesthesia Plan  ASA Score- 3     Anesthesia Type- IV sedation with anesthesia with ASA Monitors  Additional Monitors:   Airway Plan:         Plan Factors-  Patient did not smoke on day of surgery  Induction- intravenous  Postoperative Plan-     Informed Consent- Anesthetic plan and risks discussed with patient and spouse  I personally reviewed this patient with the CRNA  Discussed and agreed on the Anesthesia Plan with the CRNA  Berna Perera

## 2020-01-16 ENCOUNTER — TELEPHONE (OUTPATIENT)
Dept: DERMATOLOGY | Facility: CLINIC | Age: 85
End: 2020-01-16

## 2020-01-22 ENCOUNTER — TELEPHONE (OUTPATIENT)
Dept: DERMATOLOGY | Facility: CLINIC | Age: 85
End: 2020-01-22

## 2020-06-02 ENCOUNTER — TRANSCRIBE ORDERS (OUTPATIENT)
Dept: LAB | Facility: CLINIC | Age: 85
End: 2020-06-02

## 2020-06-02 DIAGNOSIS — Z01.818 OTHER SPECIFIED PRE-OPERATIVE EXAMINATION: Primary | ICD-10-CM

## 2020-06-12 ENCOUNTER — TRANSCRIBE ORDERS (OUTPATIENT)
Dept: ADMINISTRATIVE | Facility: HOSPITAL | Age: 85
End: 2020-06-12

## 2020-06-12 ENCOUNTER — APPOINTMENT (OUTPATIENT)
Dept: LAB | Facility: HOSPITAL | Age: 85
End: 2020-06-12
Attending: UROLOGY
Payer: MEDICARE

## 2020-06-12 ENCOUNTER — APPOINTMENT (OUTPATIENT)
Dept: PREADMISSION TESTING | Facility: HOSPITAL | Age: 85
End: 2020-06-12
Payer: MEDICARE

## 2020-06-12 DIAGNOSIS — Z01.818 PRE-OP TESTING: Primary | ICD-10-CM

## 2020-06-12 DIAGNOSIS — Z01.818 PRE-OP TESTING: ICD-10-CM

## 2020-06-12 LAB
ANION GAP SERPL CALCULATED.3IONS-SCNC: 9 MMOL/L (ref 4–13)
BACTERIA UR QL AUTO: ABNORMAL /HPF
BASOPHILS # BLD AUTO: 0.05 THOUSANDS/ΜL (ref 0–0.1)
BASOPHILS NFR BLD AUTO: 1 % (ref 0–1)
BILIRUB UR QL STRIP: NEGATIVE
BUN SERPL-MCNC: 22 MG/DL (ref 5–25)
CALCIUM SERPL-MCNC: 9.2 MG/DL (ref 8.3–10.1)
CHLORIDE SERPL-SCNC: 104 MMOL/L (ref 100–108)
CLARITY UR: CLEAR
CO2 SERPL-SCNC: 25 MMOL/L (ref 21–32)
COLOR UR: YELLOW
CREAT SERPL-MCNC: 1.12 MG/DL (ref 0.6–1.3)
EOSINOPHIL # BLD AUTO: 0.97 THOUSAND/ΜL (ref 0–0.61)
EOSINOPHIL NFR BLD AUTO: 11 % (ref 0–6)
ERYTHROCYTE [DISTWIDTH] IN BLOOD BY AUTOMATED COUNT: 13.4 % (ref 11.6–15.1)
GFR SERPL CREATININE-BSD FRML MDRD: 58 ML/MIN/1.73SQ M
GLUCOSE P FAST SERPL-MCNC: 99 MG/DL (ref 65–99)
GLUCOSE UR STRIP-MCNC: NEGATIVE MG/DL
HCT VFR BLD AUTO: 39.6 % (ref 36.5–49.3)
HGB BLD-MCNC: 12.4 G/DL (ref 12–17)
HGB UR QL STRIP.AUTO: ABNORMAL
IMM GRANULOCYTES # BLD AUTO: 0.04 THOUSAND/UL (ref 0–0.2)
IMM GRANULOCYTES NFR BLD AUTO: 1 % (ref 0–2)
KETONES UR STRIP-MCNC: NEGATIVE MG/DL
LEUKOCYTE ESTERASE UR QL STRIP: NEGATIVE
LYMPHOCYTES # BLD AUTO: 0.7 THOUSANDS/ΜL (ref 0.6–4.47)
LYMPHOCYTES NFR BLD AUTO: 8 % (ref 14–44)
MCH RBC QN AUTO: 30.4 PG (ref 26.8–34.3)
MCHC RBC AUTO-ENTMCNC: 31.3 G/DL (ref 31.4–37.4)
MCV RBC AUTO: 97 FL (ref 82–98)
MONOCYTES # BLD AUTO: 0.66 THOUSAND/ΜL (ref 0.17–1.22)
MONOCYTES NFR BLD AUTO: 8 % (ref 4–12)
NEUTROPHILS # BLD AUTO: 6.14 THOUSANDS/ΜL (ref 1.85–7.62)
NEUTS SEG NFR BLD AUTO: 71 % (ref 43–75)
NITRITE UR QL STRIP: NEGATIVE
NON-SQ EPI CELLS URNS QL MICRO: ABNORMAL /HPF
NRBC BLD AUTO-RTO: 0 /100 WBCS
PH UR STRIP.AUTO: 6 [PH]
PLATELET # BLD AUTO: 126 THOUSANDS/UL (ref 149–390)
PMV BLD AUTO: 11.5 FL (ref 8.9–12.7)
POTASSIUM SERPL-SCNC: 4.2 MMOL/L (ref 3.5–5.3)
PROT UR STRIP-MCNC: NEGATIVE MG/DL
RBC # BLD AUTO: 4.08 MILLION/UL (ref 3.88–5.62)
RBC #/AREA URNS AUTO: ABNORMAL /HPF
SODIUM SERPL-SCNC: 138 MMOL/L (ref 136–145)
SP GR UR STRIP.AUTO: 1.01 (ref 1–1.03)
UROBILINOGEN UR QL STRIP.AUTO: 0.2 E.U./DL
WBC # BLD AUTO: 8.56 THOUSAND/UL (ref 4.31–10.16)
WBC #/AREA URNS AUTO: ABNORMAL /HPF

## 2020-06-12 PROCEDURE — 81001 URINALYSIS AUTO W/SCOPE: CPT | Performed by: UROLOGY

## 2020-06-12 PROCEDURE — 80048 BASIC METABOLIC PNL TOTAL CA: CPT

## 2020-06-12 PROCEDURE — 36415 COLL VENOUS BLD VENIPUNCTURE: CPT

## 2020-06-12 PROCEDURE — 87086 URINE CULTURE/COLONY COUNT: CPT

## 2020-06-12 PROCEDURE — 85025 COMPLETE CBC W/AUTO DIFF WBC: CPT

## 2020-06-13 LAB — BACTERIA UR CULT: NORMAL

## 2020-06-15 DIAGNOSIS — Z01.818 OTHER SPECIFIED PRE-OPERATIVE EXAMINATION: ICD-10-CM

## 2020-06-15 PROCEDURE — U0003 INFECTIOUS AGENT DETECTION BY NUCLEIC ACID (DNA OR RNA); SEVERE ACUTE RESPIRATORY SYNDROME CORONAVIRUS 2 (SARS-COV-2) (CORONAVIRUS DISEASE [COVID-19]), AMPLIFIED PROBE TECHNIQUE, MAKING USE OF HIGH THROUGHPUT TECHNOLOGIES AS DESCRIBED BY CMS-2020-01-R: HCPCS

## 2020-06-16 LAB — SARS-COV-2 RNA SPEC QL NAA+PROBE: NOT DETECTED

## 2020-06-18 ENCOUNTER — ANESTHESIA EVENT (OUTPATIENT)
Dept: PERIOP | Facility: HOSPITAL | Age: 85
End: 2020-06-18
Payer: MEDICARE

## 2020-06-19 ENCOUNTER — ANESTHESIA (OUTPATIENT)
Dept: PERIOP | Facility: HOSPITAL | Age: 85
End: 2020-06-19
Payer: MEDICARE

## 2020-06-19 ENCOUNTER — HOSPITAL ENCOUNTER (OUTPATIENT)
Facility: HOSPITAL | Age: 85
Setting detail: OUTPATIENT SURGERY
Discharge: HOME | End: 2020-06-19
Attending: UROLOGY | Admitting: UROLOGY
Payer: MEDICARE

## 2020-06-19 ENCOUNTER — APPOINTMENT (OUTPATIENT)
Dept: RADIOLOGY | Facility: HOSPITAL | Age: 85
End: 2020-06-19
Payer: MEDICARE

## 2020-06-19 VITALS
WEIGHT: 189.25 LBS | DIASTOLIC BLOOD PRESSURE: 74 MMHG | SYSTOLIC BLOOD PRESSURE: 144 MMHG | BODY MASS INDEX: 30.41 KG/M2 | RESPIRATION RATE: 16 BRPM | HEART RATE: 85 BPM | OXYGEN SATURATION: 98 % | TEMPERATURE: 97.1 F | HEIGHT: 66 IN

## 2020-06-19 DIAGNOSIS — C67.9 MALIGNANT NEOPLASM OF BLADDER, UNSPECIFIED (HCC): ICD-10-CM

## 2020-06-19 PROCEDURE — C1758 CATHETER, URETERAL: HCPCS | Performed by: UROLOGY

## 2020-06-19 PROCEDURE — 74420 UROGRAPHY RTRGR +-KUB: CPT

## 2020-06-19 PROCEDURE — 88305 TISSUE EXAM BY PATHOLOGIST: CPT | Performed by: PATHOLOGY

## 2020-06-19 RX ORDER — MAGNESIUM HYDROXIDE 1200 MG/15ML
LIQUID ORAL AS NEEDED
Status: DISCONTINUED | OUTPATIENT
Start: 2020-06-19 | End: 2020-06-19 | Stop reason: HOSPADM

## 2020-06-19 RX ORDER — ONDANSETRON 2 MG/ML
INJECTION INTRAMUSCULAR; INTRAVENOUS AS NEEDED
Status: DISCONTINUED | OUTPATIENT
Start: 2020-06-19 | End: 2020-06-19 | Stop reason: SURG

## 2020-06-19 RX ORDER — FENTANYL CITRATE 50 UG/ML
INJECTION, SOLUTION INTRAMUSCULAR; INTRAVENOUS AS NEEDED
Status: DISCONTINUED | OUTPATIENT
Start: 2020-06-19 | End: 2020-06-19 | Stop reason: SURG

## 2020-06-19 RX ORDER — FENTANYL CITRATE/PF 50 MCG/ML
25 SYRINGE (ML) INJECTION
Status: DISCONTINUED | OUTPATIENT
Start: 2020-06-19 | End: 2020-06-19 | Stop reason: HOSPADM

## 2020-06-19 RX ORDER — SODIUM CHLORIDE, SODIUM LACTATE, POTASSIUM CHLORIDE, CALCIUM CHLORIDE 600; 310; 30; 20 MG/100ML; MG/100ML; MG/100ML; MG/100ML
75 INJECTION, SOLUTION INTRAVENOUS CONTINUOUS
Status: DISCONTINUED | OUTPATIENT
Start: 2020-06-19 | End: 2020-06-19 | Stop reason: HOSPADM

## 2020-06-19 RX ORDER — LIDOCAINE HYDROCHLORIDE 10 MG/ML
INJECTION, SOLUTION EPIDURAL; INFILTRATION; INTRACAUDAL; PERINEURAL AS NEEDED
Status: DISCONTINUED | OUTPATIENT
Start: 2020-06-19 | End: 2020-06-19 | Stop reason: SURG

## 2020-06-19 RX ORDER — GLYCINE 1.5 G/100ML
SOLUTION IRRIGATION AS NEEDED
Status: DISCONTINUED | OUTPATIENT
Start: 2020-06-19 | End: 2020-06-19 | Stop reason: HOSPADM

## 2020-06-19 RX ORDER — CEFAZOLIN SODIUM 2 G/50ML
2000 SOLUTION INTRAVENOUS ONCE
Status: COMPLETED | OUTPATIENT
Start: 2020-06-19 | End: 2020-06-19

## 2020-06-19 RX ORDER — SODIUM CHLORIDE, SODIUM LACTATE, POTASSIUM CHLORIDE, CALCIUM CHLORIDE 600; 310; 30; 20 MG/100ML; MG/100ML; MG/100ML; MG/100ML
125 INJECTION, SOLUTION INTRAVENOUS CONTINUOUS
Status: DISCONTINUED | OUTPATIENT
Start: 2020-06-19 | End: 2020-06-19

## 2020-06-19 RX ORDER — PROPOFOL 10 MG/ML
INJECTION, EMULSION INTRAVENOUS AS NEEDED
Status: DISCONTINUED | OUTPATIENT
Start: 2020-06-19 | End: 2020-06-19 | Stop reason: SURG

## 2020-06-19 RX ADMIN — CEFAZOLIN SODIUM 2000 MG: 2 SOLUTION INTRAVENOUS at 07:44

## 2020-06-19 RX ADMIN — SODIUM CHLORIDE, SODIUM LACTATE, POTASSIUM CHLORIDE, AND CALCIUM CHLORIDE 125 ML/HR: .6; .31; .03; .02 INJECTION, SOLUTION INTRAVENOUS at 06:59

## 2020-06-19 RX ADMIN — PROPOFOL 150 MG: 10 INJECTION, EMULSION INTRAVENOUS at 07:47

## 2020-06-19 RX ADMIN — LIDOCAINE HYDROCHLORIDE 50 MG: 10 INJECTION, SOLUTION EPIDURAL; INFILTRATION; INTRACAUDAL; PERINEURAL at 07:47

## 2020-06-19 RX ADMIN — FENTANYL CITRATE 50 MCG: 50 INJECTION, SOLUTION INTRAMUSCULAR; INTRAVENOUS at 07:47

## 2020-06-19 RX ADMIN — IOHEXOL 10 ML: 240 INJECTION, SOLUTION INTRATHECAL; INTRAVASCULAR; INTRAVENOUS; ORAL at 08:20

## 2020-06-19 RX ADMIN — FENTANYL CITRATE 50 MCG: 50 INJECTION, SOLUTION INTRAMUSCULAR; INTRAVENOUS at 07:56

## 2020-06-19 RX ADMIN — ONDANSETRON 4 MG: 2 INJECTION INTRAMUSCULAR; INTRAVENOUS at 07:54

## 2020-09-10 ENCOUNTER — OFFICE VISIT (OUTPATIENT)
Dept: DERMATOLOGY | Facility: CLINIC | Age: 85
End: 2020-09-10
Payer: MEDICARE

## 2020-09-10 ENCOUNTER — TELEPHONE (OUTPATIENT)
Dept: DERMATOLOGY | Facility: CLINIC | Age: 85
End: 2020-09-10

## 2020-09-10 VITALS — TEMPERATURE: 97.6 F | BODY MASS INDEX: 30.22 KG/M2 | WEIGHT: 188 LBS | HEIGHT: 66 IN

## 2020-09-10 DIAGNOSIS — L73.8 SEBACEOUS HYPERPLASIA: Primary | ICD-10-CM

## 2020-09-10 DIAGNOSIS — Z85.828 HISTORY OF NONMELANOMA SKIN CANCER: ICD-10-CM

## 2020-09-10 DIAGNOSIS — R21 RASH: ICD-10-CM

## 2020-09-10 PROCEDURE — 99204 OFFICE O/P NEW MOD 45 MIN: CPT | Performed by: DERMATOLOGY

## 2020-09-10 RX ORDER — TRIAMCINOLONE ACETONIDE 1 MG/G
CREAM TOPICAL
Qty: 453.6 G | Refills: 0 | Status: SHIPPED | OUTPATIENT
Start: 2020-09-10

## 2020-09-10 NOTE — PATIENT INSTRUCTIONS
1  SEBACEOUS HYPERPLASIA    Assessment and Plan:  Based on a thorough discussion of this condition and the management approach to it (including a comprehensive discussion of the known risks, side effects and potential benefits of treatment), the patient (family) agrees to implement the following specific plan:   Reassure benign    Sebaceous Hyperplasia  Sebaceous hyperplasia is a common, benign condition of enlarged oil secreting (sebaceous) glands commonly found on the forehead and cheeks of middle-aged and elderly patients  They normally appear as small yellow bumps up to 3mm in diameter that can be single or multiple  The bumps on the face often display a centrall dell  Occasionally, these bumps can occur on the chest, areola, mouth, and genitals  Rarely, they can grow to take a giant form, or be arranged linearly  Causes of sebaceous hyperplasia  Sebaceous hyperplasic is a form of benign hair follicle tumor and can often be confused with basal cell carcinoma  It can be more prevalent in immunosuppressed patients such as those undergoing organ transplantation  In the rare Danish-Elisa syndrome, sebaceous hyperplasia occurs in association with internal cancers  Lesions of sebaceous hyperplasia are benign, with no known potential for malignant transformation, but they may be associated with nonmelanoma skin cancer in transplantation patients  How we do diagnose sebaceous hyperplasia? Your dermatologist may take a closer look at the bumps with a device called a dermatoscope  Common features include a central hair follicle surrounded by yellowish lobules with prominent blood vessels  What is the treatment of sebaceous hyperplasia? Since sebaceous hyperplasia is benign with no known potential for transformation into cancer, treatment is mostly for cosmetic reasons or if the lesions become irritated   Options include   - Light electrocautery or laser vaporization  - Oral isotrentinoin is effective for extensive or disfiguring spots, but do not prevent recurrence   - Antiandrogens may be used in females to decrease the size and improve overall appearance of bumps       2  RASH      Assessment and Plan:  Based on a thorough discussion of this condition and the management approach to it (including a comprehensive discussion of the known risks, side effects and potential benefits of treatment), the patient (family) agrees to implement the following specific plan:   Apply Triamcinolone cream topically to affected areas twice a day  3   HISTORY OF NON-MELANOMA SKIN CANCER      Assessment and Plan:  Based on a thorough discussion of this condition and the management approach to it (including a comprehensive discussion of the known risks, side effects and potential benefits of treatment), the patient (family) agrees to implement the following specific plan:   Continue regular skin checks with dermatology   Use sun protection  Apply SPF 30 or higher at least three times a day  Wear sun protecting clothing and hats

## 2020-09-10 NOTE — PROGRESS NOTES
[facility] Note     Patient Name: Tapan Chambers  Encounter Date: 9/10/2020     Have you been cared for by a [facility] Dermatologist in the last 3 years and, if so, which one? No    · Have you traveled outside of the [address] [place] [address] in the past 3 months or outside of the [facility] area in the last 2 weeks? No     May we call your Preferred Phone number to discuss your specific medical information? Yes     May we leave a detailed message that includes your specific medical information? Yes      Today's Chief Concerns:   Concern #1:  Skin Check   Concern #2:      Past Medical History:  Have you personally ever had or currently have any of the following? · Skin cancer (such as Melanoma, Basal Cell Carcinoma, Squamous Cell Carcinoma? (If Yes, please provide more detail)- YES, Skin Cancer  · Eczema: No  · Psoriasis: No  · HIV/AIDS: No  · Hepatitis B or C: No  · Tuberculosis: No  · Systemic Immunosuppression such as Diabetes, Biologic or Immunotherapy, Chemotherapy, Organ Transplantation, Bone Marrow Transplantation (If YES, please provide more detail): No  · Radiation Treatment (If YES, please provide more detail): No  · Any other major medical conditions/concerns? (If Yes, which types)- No    Social History:     What is/was your primary occupation? Retired     What are your hobbies/past-times? Family History:  Have any of your "first degree relatives" (parent, brother, sister, or child) had any of the following       · Skin cancer such as Melanoma or Merkel Cell Carcinoma or Pancreatic Cancer? No  · Eczema, Asthma, Hay Fever or Seasonal Allergies: No  · Psoriasis or Psoriatic Arthritis: No  · Do any other medical conditions seem to run in your family? If Yes, what condition and which relatives?   No    Current Medications:   (please update all dermatological medications before printing patient's AVS!)      Current Outpatient Medications:     albuterol (2 5 mg/3 mL) 0 083 % nebulizer solution, Inhale 2 5 mg every 4 (four) hours as needed, Disp: , Rfl:     albuterol (VENTOLIN HFA) 90 mcg/act inhaler, Inhale 2 puffs every 6 (six) hours as needed, Disp: , Rfl:     amLODIPine (NORVASC) 2 5 mg tablet, Take 2 5 mg by mouth 2 (two) times a day , Disp: , Rfl:     aspirin 81 MG tablet, Take 81 mg by mouth daily in the early morning Last dose 6/12/20, Disp: , Rfl:     bimatoprost (LUMIGAN) 0 01 % ophthalmic drops, Administer 1 drop into the left eye daily at bedtime, Disp: , Rfl:     GLUCOSAMINE HCL PO, Take 1 tablet by mouth daily in the early morning , Disp: , Rfl:     Multiple Vitamins-Minerals (PRESERVISION AREDS 2 PO), Take by mouth 2 (two) times a day, Disp: , Rfl:     rosuvastatin (CRESTOR) 20 MG tablet, Take 20 mg by mouth daily at bedtime  , Disp: , Rfl:     Brinzolamide-Brimonidine (SIMBRINZA) 1-0 2 % SUSP, Apply to eye 2 (two) times a day Left eye only, Disp: , Rfl:     Netarsudil Dimesylate (Rhopressa) 0 02 % SOLN, Apply to eye, Disp: , Rfl:       Review of Systems:  Have you recently had or currently have any of the following? If YES, what are you doing for the problem? · Fever, chills or unintended weight loss: No  · Sudden loss or change in your vision: No  · Nausea, vomiting or blood in your stool: No  · Painful or swollen joints: No  · Wheezing or cough: No  · Changing mole or non-healing wound: No  · Nosebleeds: No  · Excessive sweating: No  · Easy or prolonged bleeding? No  · Over the last 2 weeks, how often have you been bothered by the following problems? · Taking little interest or pleasure in doing things: 1 - Not at All  · Feeling down, depressed, or hopeless: 1 - Not at All  · Rapid heartbeat with epinephrine:  No    · FEMALES ONLY:    · Are you pregnant or planning to become pregnant? N/A  · Are you currently or planning to be nursing or breast feeding? N/A    · Any known allergies?       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 Yes     Did the Dermatology Team specifically  the patient on the importance of a Full Skin Exam to be sure that nothing is missed clinically?  Yes}  o Did the patient ultimately request or accept a Full Skin Exam?  Yes  o Did the patient specifically refuse to have the areas "under-the-bra" examined by the Dermatologist? No  o Did the patient specifically refuse to have the areas "under-the-underwear" examined by the Dermatologist? No    CONSTITUTIONAL:   Vitals:    09/10/20 0941   Temp: 97 6 °F (36 4 °C)   Weight: 85 3 kg (188 lb)   Height: 5' 6" (1 676 m)       PSYCH: Normal mood and affect  EYES: Normal conjunctiva  ENT: Normal lips and oral mucosa  CARDIOVASCULAR: No edema  RESPIRATORY: Normal respirations  HEME/LYMPH/IMMUNO:  No regional lymphadenopathy except as noted below in "ASSESSMENT AND PLAN BY DIAGNOSIS"    SKIN:  FULL ORGAN SYSTEM EXAM   Hair, Scalp, Ears, Face Normal except as noted below in Assessment   Neck, Cervical Chain Nodes Normal except as noted below in Assessment   Right Arm/Hand/Fingers Normal except as noted below in Assessment   Left Arm/Hand/Fingers Normal except as noted below in Assessment   Chest/Breasts/Axillae Viewed areas Normal except as noted below in Assessment   Abdomen, Umbilicus Normal except as noted below in Assessment   Back/Spine Normal except as noted below in Assessment   Groin/Genitalia/Buttocks Normal except as noted below in Assessment   Right Leg, Foot, Toes Normal except as noted below in Assessment   Left Leg, Foot, Toes Normal except as noted below in Assessment        Assessment and Plan by Diagnosis:    History of Present Condition:     Duration:  How long has this been an issue for you?    o  Weeks   Location Affected:  Where on the body is this affecting you?    o  Arms   Quality:  Is there any bleeding, pain, itch, burning/irritation, or redness associated with the skin lesion?    o  Itching   Severity:  Describe any bleeding, pain, itch, burning/irritation, or redness on a scale of 1 to 10 (with 10 being the worst)  o  1   Timing:  Does this condition seem to be there pretty constantly or do you notice it more at specific times throughout the day?    o  Denies   Context:  Have you ever noticed that this condition seems to be associated with specific activities you do?    o  Denies   Modifying Factors:    o Anything that seems to make the condition worse?    -  Denies  o What have you tried to do to make the condition better? -  Denies   Associated Signs and Symptoms:  Does this skin lesion seem to be associated with any of the following:  o  SL AMB DERM SIGNS AND SYMPTOMS: Itching and Scratching       1  SEBACEOUS HYPERPLASIA    Physical Exam:   Anatomic Location Affected:  Nose   Morphological Description:  Yellow papules and white papules   Pertinent Positives:   Pertinent Negatives: No Lymphadenopathy    Assessment and Plan:  Based on a thorough discussion of this condition and the management approach to it (including a comprehensive discussion of the known risks, side effects and potential benefits of treatment), the patient (family) agrees to implement the following specific plan:   Reassure benign    Sebaceous Hyperplasia  Sebaceous hyperplasia is a common, benign condition of enlarged oil secreting (sebaceous) glands commonly found on the forehead and cheeks of middle-aged and elderly patients  They normally appear as small yellow bumps up to 3mm in diameter that can be single or multiple  The bumps on the face often display a centrall dell   Occasionally, these bumps can occur on the chest, areola, mouth, and genitals  Rarely, they can grow to take a giant form, or be arranged linearly  Causes of sebaceous hyperplasia  Sebaceous hyperplasic is a form of benign hair follicle tumor and can often be confused with basal cell carcinoma  It can be more prevalent in immunosuppressed patients such as those undergoing organ transplantation  In the rare Danish-Elisa syndrome, sebaceous hyperplasia occurs in association with internal cancers  Lesions of sebaceous hyperplasia are benign, with no known potential for malignant transformation, but they may be associated with nonmelanoma skin cancer in transplantation patients  How we do diagnose sebaceous hyperplasia? Your dermatologist may take a closer look at the bumps with a device called a dermatoscope  Common features include a central hair follicle surrounded by yellowish lobules with prominent blood vessels  What is the treatment of sebaceous hyperplasia? Since sebaceous hyperplasia is benign with no known potential for transformation into cancer, treatment is mostly for cosmetic reasons or if the lesions become irritated  Options include   - Light electrocautery or laser vaporization  - Oral isotrentinoin is effective for extensive or disfiguring spots, but do not prevent recurrence   - Antiandrogens may be used in females to decrease the size and improve overall appearance of bumps       2  RASH  Possible delayed drug reaction  Physical Exam:   (Anatomic Location); (Size and Morphological Description); (Differential Diagnosis):  o Erythematous mildly lichenified, innertiginous areas of neck, inner forearms, inframammary area   Pertinent Positives:present for several weeks   Status post installation of gemcitadine about 2 months ago  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     Pertinent Negatives: No Lymphadenopathy    Assessment and Plan:  Based on a thorough discussion of this condition and the management approach to it (including a comprehensive discussion of the known risks, side effects and potential benefits of treatment), the patient (family) agrees to implement the following specific plan:   Apply Triamcinolone cream topically to affected areas twice a day   The present examthem is mild and will obserce for progreesion  Tihis may represent delayed drug exanthem but I find no report in literatue of cross sensitivity of mitomcyin and gemcitabine   I discussed importance of alerting us if there is progression  3   HISTORY OF NON-MELANOMA SKIN CANCER    Physical Exam:   Anatomic Location Affected:  Nose   Morphological Description of scar:  Well healed scar   Suspected Recurrence: No   Pertinent Positives:   Pertinent Negatives: No Lymphadenopathy      Additional History of Present Condition:  History of non-melanoma skin cancer  with no sign of recurrence    Assessment and Plan:  Based on a thorough discussion of this condition and the management approach to it (including a comprehensive discussion of the known risks, side effects and potential benefits of treatment), the patient (family) agrees to implement the following specific plan:   Continue regular skin checks with dermatology   Use sun protection  Apply SPF 30 or higher at least three times a day  Wear sun protecting clothing and hats        Scribe Attestation    I,:   Abrahan Saavedra am acting as a scribe while in the presence of the attending physician :        I,:   Sharon Lewis MD personally performed the services described in this documentation    as scribed in my presence :

## 2020-09-10 NOTE — TELEPHONE ENCOUNTER
Patients wife called stating Shilpa Valdes on [phone] did not receive the cream Dr Marga Rendon prescribed  Please advise

## 2020-09-10 NOTE — LETTER
September 10, 2020     Felicia Almaraz MD  [address]  [address]     Patient: Erica Shepherd   YOB: 1930   Date of Visit: 9/10/2020       Dear Dr Yenny Lee: Thank you for referring Kush Hutson to me for evaluation  Below are my notes for this consultation  If you have questions, please do not hesitate to call me  I look forward to following your patient along with you  Sincerely,        Scarlet Ashton MD        CC: MD Scarlet Hammond MD  9/10/2020  6:21 PM  Incomplete  [facility] Note     Patient Name: Erica Shepherd  Encounter Date: 9/10/2020     Have you been cared for by a [facility]atologist in the last 3 years and, if so, which one? No    · Have you traveled outside of the [address] [place] [address] in the past 3 months or outside of the [facility] area in the last 2 weeks? No     May we call your Preferred Phone number to discuss your specific medical information? Yes     May we leave a detailed message that includes your specific medical information? Yes      Today's Chief Concerns:   Concern #1:  Skin Check   Concern #2:      Past Medical History:  Have you personally ever had or currently have any of the following? · Skin cancer (such as Melanoma, Basal Cell Carcinoma, Squamous Cell Carcinoma? (If Yes, please provide more detail)- YES, Skin Cancer  · Eczema: No  · Psoriasis: No  · HIV/AIDS: No  · Hepatitis B or C: No  · Tuberculosis: No  · Systemic Immunosuppression such as Diabetes, Biologic or Immunotherapy, Chemotherapy, Organ Transplantation, Bone Marrow Transplantation (If YES, please provide more detail): No  · Radiation Treatment (If YES, please provide more detail): No  · Any other major medical conditions/concerns? (If Yes, which types)- No    Social History:     What is/was your primary occupation? Retired     What are your hobbies/past-times?      Family History:  Have any of your "first degree relatives" (parent, brother, sister, or child) had any of the following       · Skin cancer such as Melanoma or Merkel Cell Carcinoma or Pancreatic Cancer? No  · Eczema, Asthma, Hay Fever or Seasonal Allergies: No  · Psoriasis or Psoriatic Arthritis: No  · Do any other medical conditions seem to run in your family? If Yes, what condition and which relatives? No    Current Medications:   (please update all dermatological medications before printing patient's AVS!)      Current Outpatient Medications:     albuterol (2 5 mg/3 mL) 0 083 % nebulizer solution, Inhale 2 5 mg every 4 (four) hours as needed, Disp: , Rfl:     albuterol (VENTOLIN HFA) 90 mcg/act inhaler, Inhale 2 puffs every 6 (six) hours as needed, Disp: , Rfl:     amLODIPine (NORVASC) 2 5 mg tablet, Take 2 5 mg by mouth 2 (two) times a day , Disp: , Rfl:     aspirin 81 MG tablet, Take 81 mg by mouth daily in the early morning Last dose 6/12/20, Disp: , Rfl:     bimatoprost (LUMIGAN) 0 01 % ophthalmic drops, Administer 1 drop into the left eye daily at bedtime, Disp: , Rfl:     GLUCOSAMINE HCL PO, Take 1 tablet by mouth daily in the early morning , Disp: , Rfl:     Multiple Vitamins-Minerals (PRESERVISION AREDS 2 PO), Take by mouth 2 (two) times a day, Disp: , Rfl:     rosuvastatin (CRESTOR) 20 MG tablet, Take 20 mg by mouth daily at bedtime  , Disp: , Rfl:     Brinzolamide-Brimonidine (SIMBRINZA) 1-0 2 % SUSP, Apply to eye 2 (two) times a day Left eye only, Disp: , Rfl:     Netarsudil Dimesylate (Rhopressa) 0 02 % SOLN, Apply to eye, Disp: , Rfl:       Review of Systems:  Have you recently had or currently have any of the following? If YES, what are you doing for the problem?     · Fever, chills or unintended weight loss: No  · Sudden loss or change in your vision: No  · Nausea, vomiting or blood in your stool: No  · Painful or swollen joints: No  · Wheezing or cough: No  · Changing mole or non-healing wound: No  · Nosebleeds: No  · Excessive sweating: No  · Easy or prolonged bleeding? No  · Over the last 2 weeks, how often have you been bothered by the following problems? · Taking little interest or pleasure in doing things: 1 - Not at All  · Feeling down, depressed, or hopeless: 1 - Not at All  · Rapid heartbeat with epinephrine:  No    · FEMALES ONLY:    · Are you pregnant or planning to become pregnant? N/A  · Are you currently or planning to be nursing or breast feeding? N/A    · Any known allergies? 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 Yes     Did the Dermatology Team specifically  the patient on the importance of a Full Skin Exam to be sure that nothing is missed clinically?  Yes}  o Did the patient ultimately request or accept a Full Skin Exam?  Yes  o Did the patient specifically refuse to have the areas "under-the-bra" examined by the Dermatologist? No  o Did the patient specifically refuse to have the areas "under-the-underwear" examined by the Dermatologist? No    CONSTITUTIONAL:   Vitals:    09/10/20 0941   Temp: 97 6 °F (36 4 °C)   Weight: 85 3 kg (188 lb)   Height: 5' 6" (1 676 m)       PSYCH: Normal mood and affect  EYES: Normal conjunctiva  ENT: Normal lips and oral mucosa  CARDIOVASCULAR: No edema  RESPIRATORY: Normal respirations  HEME/LYMPH/IMMUNO:  No regional lymphadenopathy except as noted below in "ASSESSMENT AND PLAN BY DIAGNOSIS"    SKIN:  FULL ORGAN SYSTEM EXAM   Hair, Scalp, Ears, Face Normal except as noted below in Assessment   Neck, Cervical Chain Nodes Normal except as noted below in Assessment   Right Arm/Hand/Fingers Normal except as noted below in Assessment   Left Arm/Hand/Fingers Normal except as noted below in Assessment   Chest/Breasts/Axillae Viewed areas Normal except as noted below in Assessment   Abdomen, Umbilicus Normal except as noted below in Assessment   Back/Spine Normal except as noted below in Assessment   Groin/Genitalia/Buttocks Normal except as noted below in Assessment   Right Leg, Foot, Toes Normal except as noted below in Assessment   Left Leg, Foot, Toes Normal except as noted below in Assessment        Assessment and Plan by Diagnosis:    History of Present Condition:     Duration:  How long has this been an issue for you?    o  Weeks   Location Affected:  Where on the body is this affecting you?    o  Arms   Quality:  Is there any bleeding, pain, itch, burning/irritation, or redness associated with the skin lesion?    o  Itching   Severity:  Describe any bleeding, pain, itch, burning/irritation, or redness on a scale of 1 to 10 (with 10 being the worst)  o  1   Timing:  Does this condition seem to be there pretty constantly or do you notice it more at specific times throughout the day?    o  Denies   Context:  Have you ever noticed that this condition seems to be associated with specific activities you do?    o  Denies   Modifying Factors:    o Anything that seems to make the condition worse?    -  Denies  o What have you tried to do to make the condition better? -  Denies   Associated Signs and Symptoms:  Does this skin lesion seem to be associated with any of the following:  o  SL AMB DERM SIGNS AND SYMPTOMS: Itching and Scratching       1   SEBACEOUS HYPERPLASIA    Physical Exam:   Anatomic Location Affected:  Nose   Morphological Description:  Yellow papules and white papules   Pertinent Positives:   Pertinent Negatives: No Lymphadenopathy    Assessment and Plan:  Based on a thorough discussion of this condition and the management approach to it (including a comprehensive discussion of the known risks, side effects and potential benefits of treatment), the patient (family) agrees to implement the following specific plan:   Reassure benign    Sebaceous Hyperplasia  Sebaceous hyperplasia is a common, benign condition of enlarged oil secreting (sebaceous) glands commonly found on the forehead and cheeks of middle-aged and elderly patients  They normally appear as small yellow bumps up to 3mm in diameter that can be single or multiple  The bumps on the face often display a centrall dell  Occasionally, these bumps can occur on the chest, areola, mouth, and genitals  Rarely, they can grow to take a giant form, or be arranged linearly  Causes of sebaceous hyperplasia  Sebaceous hyperplasic is a form of benign hair follicle tumor and can often be confused with basal cell carcinoma  It can be more prevalent in immunosuppressed patients such as those undergoing organ transplantation  In the rare Danish-Walton syndrome, sebaceous hyperplasia occurs in association with internal cancers  Lesions of sebaceous hyperplasia are benign, with no known potential for malignant transformation, but they may be associated with nonmelanoma skin cancer in transplantation patients  How we do diagnose sebaceous hyperplasia? Your dermatologist may take a closer look at the bumps with a device called a dermatoscope  Common features include a central hair follicle surrounded by yellowish lobules with prominent blood vessels  What is the treatment of sebaceous hyperplasia? Since sebaceous hyperplasia is benign with no known potential for transformation into cancer, treatment is mostly for cosmetic reasons or if the lesions become irritated  Options include   - Light electrocautery or laser vaporization  - Oral isotrentinoin is effective for extensive or disfiguring spots, but do not prevent recurrence   - Antiandrogens may be used in females to decrease the size and improve overall appearance of bumps       2   RASH Possible delayed drug reaction  Physical Exam:   (Anatomic Location); (Size and Morphological Description); (Differential Diagnosis):  o Erythematous mildly lichenified, innertiginous areas of neck, inner forearms, inframammary area   Pertinent Positives:present for several weeks   Status post installation of gemcitadine about 2 months ago  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   Pertinent Negatives: No Lymphadenopathy    Assessment and Plan:  Based on a thorough discussion of this condition and the management approach to it (including a comprehensive discussion of the known risks, side effects and potential benefits of treatment), the patient (family) agrees to implement the following specific plan:   Apply Triamcinolone cream topically to affected areas twice a day   The present examthem is mild and will obserce for progreesion  Tihis may represent delayed drug exanthem but I find no report in literatue of cross sensitivity of mitomcyin and gemcitabine   I discussed importance of alerting us if there is progression  3   HISTORY OF NON-MELANOMA SKIN CANCER    Physical Exam:   Anatomic Location Affected:  Nose   Morphological Description of scar:  Well healed scar   Suspected Recurrence: No   Pertinent Positives:   Pertinent Negatives: No Lymphadenopathy      Additional History of Present Condition:  History of non-melanoma skin cancer  with no sign of recurrence    Assessment and Plan:  Based on a thorough discussion of this condition and the management approach to it (including a comprehensive discussion of the known risks, side effects and potential benefits of treatment), the patient (family) agrees to implement the following specific plan:   Continue regular skin checks with dermatology   Use sun protection  Apply SPF 30 or higher at least three times a day    Wear sun protecting clothing and hats        Scribe Attestation    I,:   Doreen Koroma am acting as a scribe while in the presence of the attending physician :        I,:   Penelope Braun MD personally performed the services described in this documentation    as scribed in my presence :

## 2020-10-13 ENCOUNTER — TELEPHONE (OUTPATIENT)
Dept: DERMATOLOGY | Facility: CLINIC | Age: 85
End: 2020-10-13

## 2020-10-19 DIAGNOSIS — L27.0 DRUG ERUPTION: Primary | ICD-10-CM

## 2020-10-27 ENCOUNTER — OFFICE VISIT (OUTPATIENT)
Dept: DERMATOLOGY | Facility: CLINIC | Age: 85
End: 2020-10-27
Payer: MEDICARE

## 2020-10-27 ENCOUNTER — TELEPHONE (OUTPATIENT)
Dept: DERMATOLOGY | Facility: CLINIC | Age: 85
End: 2020-10-27

## 2020-10-27 VITALS — TEMPERATURE: 98 F | WEIGHT: 187.6 LBS | BODY MASS INDEX: 30.28 KG/M2

## 2020-10-27 DIAGNOSIS — R21 RASH: Primary | ICD-10-CM

## 2020-10-27 DIAGNOSIS — D48.9 NEOPLASM OF UNCERTAIN BEHAVIOR: ICD-10-CM

## 2020-10-27 PROCEDURE — 11104 PUNCH BX SKIN SINGLE LESION: CPT | Performed by: STUDENT IN AN ORGANIZED HEALTH CARE EDUCATION/TRAINING PROGRAM

## 2020-10-27 PROCEDURE — 88305 TISSUE EXAM BY PATHOLOGIST: CPT | Performed by: STUDENT IN AN ORGANIZED HEALTH CARE EDUCATION/TRAINING PROGRAM

## 2020-10-27 PROCEDURE — 88313 SPECIAL STAINS GROUP 2: CPT | Performed by: STUDENT IN AN ORGANIZED HEALTH CARE EDUCATION/TRAINING PROGRAM

## 2020-10-27 PROCEDURE — 99213 OFFICE O/P EST LOW 20 MIN: CPT | Performed by: STUDENT IN AN ORGANIZED HEALTH CARE EDUCATION/TRAINING PROGRAM

## 2020-10-27 RX ORDER — PREDNISONE 50 MG/1
TABLET ORAL
Qty: 5 TABLET | Refills: 0 | Status: SHIPPED | OUTPATIENT
Start: 2020-10-27 | End: 2020-12-10

## 2020-10-27 RX ORDER — PREDNISONE 20 MG/1
TABLET ORAL
Qty: 10 TABLET | Refills: 0 | Status: SHIPPED | OUTPATIENT
Start: 2020-10-27 | End: 2020-12-10

## 2020-12-10 ENCOUNTER — OFFICE VISIT (OUTPATIENT)
Dept: DERMATOLOGY | Facility: CLINIC | Age: 85
End: 2020-12-10
Payer: MEDICARE

## 2020-12-10 VITALS — TEMPERATURE: 97.5 F | BODY MASS INDEX: 30.6 KG/M2 | HEIGHT: 66 IN | WEIGHT: 190.4 LBS

## 2020-12-10 DIAGNOSIS — R21 RASH: Primary | ICD-10-CM

## 2020-12-10 PROCEDURE — 99214 OFFICE O/P EST MOD 30 MIN: CPT | Performed by: DERMATOLOGY

## 2020-12-10 RX ORDER — PREDNISONE 10 MG/1
TABLET ORAL
Qty: 21 TABLET | Refills: 0 | Status: SHIPPED | OUTPATIENT
Start: 2020-12-10 | End: 2021-01-04 | Stop reason: ALTCHOICE

## 2020-12-10 RX ORDER — CLOBETASOL PROPIONATE 0.5 MG/G
CREAM TOPICAL 2 TIMES DAILY
Qty: 30 G | Refills: 0 | Status: SHIPPED | OUTPATIENT
Start: 2020-12-10

## 2021-01-04 ENCOUNTER — TELEPHONE (OUTPATIENT)
Dept: DERMATOLOGY | Facility: CLINIC | Age: 86
End: 2021-01-04

## 2021-01-04 DIAGNOSIS — L27.0 DRUG ERUPTION: Primary | ICD-10-CM

## 2021-01-04 RX ORDER — PREDNISONE 10 MG/1
10 TABLET ORAL DAILY
Qty: 1 TABLET | Refills: 0 | Status: SHIPPED | OUTPATIENT
Start: 2021-01-04 | End: 2021-01-06 | Stop reason: SDUPTHER

## 2021-01-04 NOTE — TELEPHONE ENCOUNTER
Notes recurrent generalixed itch of shoulders and scalp  Recent history of drug induce rash with biopsy and circulating eosinophilia  Prior episode year ago required tacrolimus systemically  Will place back on low dose prednisone  If failure to resolve will need to be seen for more aggressive intervention

## 2021-01-04 NOTE — TELEPHONE ENCOUNTER
Pt wife Ana Grandchild called stating that the rash is back and the one on his back is black and blue and itching and if Dr Frances Elena can give them a call back

## 2021-01-06 ENCOUNTER — TELEPHONE (OUTPATIENT)
Dept: DERMATOLOGY | Facility: CLINIC | Age: 86
End: 2021-01-06

## 2021-01-06 DIAGNOSIS — L27.0 DRUG ERUPTION: Primary | ICD-10-CM

## 2021-01-06 RX ORDER — PREDNISONE 1 MG/1
TABLET ORAL
Qty: 60 TABLET | Refills: 1 | Status: SHIPPED | OUTPATIENT
Start: 2021-01-06 | End: 2021-02-06

## 2021-01-06 NOTE — TELEPHONE ENCOUNTER
Pt wife called stating that she went to the pharmacy to  prednisone tablet  but only got one tablet for 1 day DR Fitzpatrick need to resent script for more

## 2021-01-21 NOTE — TELEPHONE ENCOUNTER
Pt wife Carri Valentine called stating DR Fitzpatrick asked her to call him in two weeks with an update on her husbands rash  She is asking for him return a call to them  She was told a message would be sent to him

## 2021-01-29 ENCOUNTER — TELEPHONE (OUTPATIENT)
Dept: DERMATOLOGY | Facility: CLINIC | Age: 86
End: 2021-01-29

## 2021-02-13 ENCOUNTER — NURSE TRIAGE (OUTPATIENT)
Dept: OTHER | Facility: OTHER | Age: 86
End: 2021-02-13

## 2021-02-13 ENCOUNTER — DOCUMENTATION (OUTPATIENT)
Dept: DERMATOLOGY | Facility: CLINIC | Age: 86
End: 2021-02-13

## 2021-02-13 DIAGNOSIS — D69.2 PURPURA (HCC): Primary | ICD-10-CM

## 2021-02-14 NOTE — PROGRESS NOTES
Received call from [employer]- wife concerned about new purple rash on patient's arm  Facetimed with patient and his wife and examined skin lesions  Skin lesion on right arm appears consistent with purpura due to age and chronic prednisone use  Otherwise, patient is asymptomatic- only on 81mg once daily  Discussed with patient and wife that as long as rash is not getting worse (did caution that it may take awhile to resolve) and patient does not develop any systemic signs such as pain of the extremity, fever, nausea, vomiting etc no need to go to ER  Will have patient come into outpatient dermatology clinic on Friday to be seen by myself and Dr Radha Justice  Will order CBC and instructed pt to obtain lab prior to appointment  The patient was discussed with Dr Yudith Billings  Dermatology PGY-2 Resident Physician

## 2021-02-14 NOTE — TELEPHONE ENCOUNTER
Regarding: Red spots on arm (blood)  ----- Message from Jorge Daly sent at 2/13/2021  6:57 PM EST -----  "My 's arm has dark red splotches from his wrist up to his elbow  I believe it may be blood  He's been on prednisone for a while, I'm not sure if that's what has been causing it  He can move his arm and it is warm   It's from the back of his hand up towards his elbow "

## 2021-02-14 NOTE — TELEPHONE ENCOUNTER
Patients symptoms paged to on call provider  Office protocol recommends to call provider before sending patient to ED  Dr Nanci Holguin stated she  Would call patient  Reason for Disposition   [1] Purple or blood-colored rash (spots or dots) AND [2] no fever AND [3] sounds well to triager    Answer Assessment - Initial Assessment Questions  1  APPEARANCE of RASH: "Describe the rash " (e g , spots, blisters, raised areas, skin peeling, scaly)      Deep purple blotches on right arm  Does not kathryn  2  SIZE: "How big are the spots?" (e g , tip of pen, eraser, coin; inches, centimeters)  5 or 6 inches for larges area  Several other half dollar sized blotches  3  LOCATION: "Where is the rash located?"      From wrist to elbow on right arm  4  COLOR: "What color is the rash?" (Note: It is difficult to assess rash color in people with darker-colored skin  When this situation occurs, simply ask the caller to describe what they see )      Deep purple  5  ONSET: "When did the rash begin?"      Yesterday    6  FEVER: "Do you have a fever?" If so, ask: "What is your temperature, how was it measured, and when did it start?"      Current temp 97 1 oral    7  ITCHING: "Does the rash itch?" If so, ask: "How bad is the itch?" (Scale 1-10; or mild, moderate, severe)      No itching or pain  8  CAUSE: "What do you think is causing the rash?"      Unknown    9  NEW MEDICATION: "What new medication are you taking?" (e g , name of antibiotic) "When did you start taking this medication?"  Has been taking prednisone for skin rashes due to chemo  10  OTHER SYMPTOMS: "Do you have any other symptoms?" (e g , sore throat, fever, joint pain)        Denies      Protocols used: RASH - WIDESPREAD ON DRUGS-ADULT-

## 2021-02-19 ENCOUNTER — LAB (OUTPATIENT)
Dept: LAB | Facility: CLINIC | Age: 86
End: 2021-02-19
Payer: MEDICARE

## 2021-02-19 ENCOUNTER — OFFICE VISIT (OUTPATIENT)
Dept: DERMATOLOGY | Facility: CLINIC | Age: 86
End: 2021-02-19
Payer: MEDICARE

## 2021-02-19 VITALS — HEIGHT: 66 IN | TEMPERATURE: 97.8 F | WEIGHT: 190.38 LBS | BODY MASS INDEX: 30.59 KG/M2

## 2021-02-19 DIAGNOSIS — D69.2 PURPURA (HCC): ICD-10-CM

## 2021-02-19 DIAGNOSIS — L20.9 ATOPIC DERMATITIS, UNSPECIFIED TYPE: Primary | ICD-10-CM

## 2021-02-19 LAB
BASOPHILS # BLD AUTO: 0.05 THOUSANDS/ΜL (ref 0–0.1)
BASOPHILS NFR BLD AUTO: 1 % (ref 0–1)
EOSINOPHIL # BLD AUTO: 0.24 THOUSAND/ΜL (ref 0–0.61)
EOSINOPHIL NFR BLD AUTO: 2 % (ref 0–6)
ERYTHROCYTE [DISTWIDTH] IN BLOOD BY AUTOMATED COUNT: 14.2 % (ref 11.6–15.1)
HCT VFR BLD AUTO: 43.3 % (ref 36.5–49.3)
HGB BLD-MCNC: 13.2 G/DL (ref 12–17)
IMM GRANULOCYTES # BLD AUTO: 0.08 THOUSAND/UL (ref 0–0.2)
IMM GRANULOCYTES NFR BLD AUTO: 1 % (ref 0–2)
LYMPHOCYTES # BLD AUTO: 0.39 THOUSANDS/ΜL (ref 0.6–4.47)
LYMPHOCYTES NFR BLD AUTO: 4 % (ref 14–44)
MCH RBC QN AUTO: 29.9 PG (ref 26.8–34.3)
MCHC RBC AUTO-ENTMCNC: 30.5 G/DL (ref 31.4–37.4)
MCV RBC AUTO: 98 FL (ref 82–98)
MONOCYTES # BLD AUTO: 0.49 THOUSAND/ΜL (ref 0.17–1.22)
MONOCYTES NFR BLD AUTO: 5 % (ref 4–12)
NEUTROPHILS # BLD AUTO: 9.46 THOUSANDS/ΜL (ref 1.85–7.62)
NEUTS SEG NFR BLD AUTO: 87 % (ref 43–75)
NRBC BLD AUTO-RTO: 0 /100 WBCS
PLATELET # BLD AUTO: 141 THOUSANDS/UL (ref 149–390)
PMV BLD AUTO: 10.9 FL (ref 8.9–12.7)
RBC # BLD AUTO: 4.41 MILLION/UL (ref 3.88–5.62)
WBC # BLD AUTO: 10.71 THOUSAND/UL (ref 4.31–10.16)

## 2021-02-19 PROCEDURE — 99214 OFFICE O/P EST MOD 30 MIN: CPT | Performed by: DERMATOLOGY

## 2021-02-19 PROCEDURE — 83516 IMMUNOASSAY NONANTIBODY: CPT | Performed by: DERMATOLOGY

## 2021-02-19 PROCEDURE — 85025 COMPLETE CBC W/AUTO DIFF WBC: CPT

## 2021-02-19 PROCEDURE — 36415 COLL VENOUS BLD VENIPUNCTURE: CPT

## 2021-02-19 RX ORDER — PREDNISONE 10 MG/1
TABLET ORAL DAILY
COMMUNITY
End: 2021-03-08 | Stop reason: DRUGHIGH

## 2021-02-19 NOTE — PROGRESS NOTES
[facility] Follow Up Note    Patient Name: Raleigh Garcia  Encounter Date: 02/19/2021    Today's Chief Concerns:  Aparna Craven Concern #1:  Follow up rash     Current Medications:    Current Outpatient Medications:     albuterol (2 5 mg/3 mL) 0 083 % nebulizer solution, Inhale 2 5 mg every 4 (four) hours as needed, Disp: , Rfl:     albuterol (VENTOLIN HFA) 90 mcg/act inhaler, Inhale 2 puffs every 6 (six) hours as needed, Disp: , Rfl:     amLODIPine (NORVASC) 2 5 mg tablet, Take 2 5 mg by mouth 2 (two) times a day , Disp: , Rfl:     aspirin 81 MG tablet, Take 81 mg by mouth daily in the early morning Last dose 6/12/20, Disp: , Rfl:     bimatoprost (LUMIGAN) 0 01 % ophthalmic drops, Administer 1 drop into the left eye daily at bedtime, Disp: , Rfl:     GLUCOSAMINE HCL PO, Take 1 tablet by mouth daily in the early morning , Disp: , Rfl:     Multiple Vitamins-Minerals (PRESERVISION AREDS 2 PO), Take by mouth 2 (two) times a day, Disp: , Rfl:     predniSONE 10 mg tablet, Take by mouth daily, Disp: , Rfl:     rosuvastatin (CRESTOR) 20 MG tablet, Take 20 mg by mouth daily at bedtime  , Disp: , Rfl:     Brinzolamide-Brimonidine (SIMBRINZA) 1-0 2 % SUSP, Apply to eye 2 (two) times a day Left eye only, Disp: , Rfl:     Clobetasol Prop Emollient Base 0 05 % emollient cream, Apply topically 2 (two) times a day (Patient not taking: Reported on 2/19/2021), Disp: 30 g, Rfl: 0    Netarsudil Dimesylate (Rhopressa) 0 02 % SOLN, Apply to eye, Disp: , Rfl:     triamcinolone (KENALOG) 0 1 % cream, Apply topically to affected areas twice a day (Patient not taking: Reported on 2/19/2021), Disp: 453 6 g, Rfl: 0    triamcinolone (KENALOG) 0 1 % ointment, Apply topically to the affected areas 2x/day  Do not apply on face and groin   (Patient not taking: Reported on 2/19/2021), Disp: 454 g, Rfl: 1    CONSTITUTIONAL:   Vitals:    02/19/21 1330   Temp: 97 8 °F (36 6 °C)   TempSrc: Tympanic   Weight: 86 4 kg (190 lb 6 oz) Height: 5' 6" (1 676 m)       Specific Alerts:    Have you been seen by a [facility]atologist in the last 3 years? YES    Are you pregnant or planning to become pregnant? No    Are you currently or planning to be nursing or breast feeding? No    Allergies   Allergen Reactions    Bcg Live Other (See Comments)     Vidal, became very ill    Mitomycin Rash     Chronic rash which lasted for months    Xalatan [Latanoprost]      Back ache-severe    Carboxymethylcellulose     Lipitor [Atorvastatin] GI Intolerance     Stomach pain    Other Rash     Cat Scan Dye       May we call your Preferred Phone number to discuss your specific medical information? YES    May we leave a detailed message that includes your specific medical information? YES    Have you traveled outside of the [facility] in the past 3 months? No    Do you currently have a pacemaker or defibrillator? No    Do you have any artificial heart valves, joints, plates, screws, rods, stents, pins, etc? No   - If Yes, were any placed within the last 2 years? No     Do you require any medications prior to a surgical procedure? No   - If Yes, for which procedure? n a   - If Yes, what medications to you require? n a    Are you taking any medications that cause you to bleed more easily ("blood thinners") No    Have you ever experienced a rapid heartbeat with epinephrine? No    Have you ever been treated with "gold" (gold sodium thiomalate) therapy? No    [facility] can help with wrinkles, "laugh lines," facial volume loss, "double chin," "love handles," age spots, and more  Are you interested in learning today about some of the skin enhancement procedures that we offer? (If Yes, please provide more detail) No    Review of Systems:  Have you recently had or currently have any of the following?     · Fever or chills: No  · Night Sweats: No  · Headaches: No  · Weight Gain: No  · Weight Loss: No  · Blurry Vision: YES  · Nausea: No  · Vomiting: No  · Diarrhea: No  · Blood in Stool: No  · Abdominal Pain: No  · Itchy Skin: YES  · Painful Joints: No  · Swollen Joints: No  · Muscle Pain: No  · Irregular Mole: No  · Sun Burn: No  · Dry Skin: YES  · Skin Color Changes: No  · Scar or Keloid: No  · Cold Sores/Fever Blisters: No  · Bacterial Infections/MRSA: No  · Anxiety: No  · Depression: No  · Suicidal or Homicidal Thoughts: No      PSYCH: Normal mood and affect  EYES: Normal conjunctiva  ENT: Normal lips and oral mucosa  CARDIOVASCULAR: No edema  RESPIRATORY: Normal respirations  HEME/LYMPH/IMMUNO:  No regional lymphadenopathy except as noted below in ASSESSMENT AND PLAN BY DIAGNOSIS    FULL ORGAN SYSTEM SKIN EXAM (SKIN)   Hair, Scalp, Ears, Face Normal except as noted below in Assessment   Neck, Cervical Chain Nodes Normal except as noted below in Assessment   Right Arm/Hand/Fingers Normal except as noted below in Assessment   Left Arm/Hand/Fingers Normal except as noted below in Assessment   Chest/Breasts/Axillae Viewed areas Normal except as noted below in Assessment   Abdomen, Umbilicus Normal except as noted below in Assessment   Back/Spine Normal except as noted below in Assessment   Groin/Genitalia/Buttocks    Right Leg, Foot, Toes    Left Leg, Foot, Toes       1  CHRONIC SEVERE DERMATITIS   Physical Exam:   Anatomic Location Affected:  Bilateral arms, chest, and  scalp   Morphological Description:  Patches of erythema with fine scale, diffuse erythema of scalp, 25 % body surface area  affected     Additional History of Present Condition:  Patient has current history of bladder cancer for which he has been getting surgeries every 4 months for 15 years  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  As soon as patient gets off of steroids, his dermatitis flares uncontrollably    Patient has had no change in his medications for many years; his environment has not changed    Assessment and Plan:  Based on a thorough discussion of this condition and the management approach to it (including a comprehensive discussion of the known risks, side effects and potential benefits of treatment), the patient (family) agrees to implement the following specific plan:   Biopsy consistent with hypersensivity reaction- will obtain  and 230 just in case    Will try to obtain Dupixent for patient for atopic dermatitis, otherwise unspecified   Continue prednisone 10 mg daily   Discussed other options such as mycophenolate mofetil and tacrolimus; however, significant concern given patient's active bladder cancer history    Return to clinic in 4 weeks       Scribe Attestation    I,:  Itzel Kraft MA am acting as a scribe while in the presence of the attending physician :       I,:  Levi Mendes MD personally performed the services described in this documentation    as scribed in my presence :

## 2021-02-19 NOTE — PATIENT INSTRUCTIONS
Assessment and Plan:  Based on a thorough discussion of this condition and the management approach to it (including a comprehensive discussion of the known risks, side effects and potential benefits of treatment), the patient (family) agrees to implement the following specific plan:   Get Bullous pemphigoid 180 & 230 abs lab done   Will try to obtain dupixent   Cont prednisone 10 mg daily

## 2021-02-25 ENCOUNTER — TELEPHONE (OUTPATIENT)
Dept: DERMATOLOGY | Age: 86
End: 2021-02-25

## 2021-02-25 NOTE — TELEPHONE ENCOUNTER
Farhat Lay,    Can you please complete the prescription forms that Brian Benavidez added to the chart and fax them over to the company?     Thanks,  [employer]	[place]

## 2021-03-05 LAB — MISCELLANEOUS LAB TEST RESULT: NORMAL

## 2021-03-08 ENCOUNTER — TELEPHONE (OUTPATIENT)
Dept: DERMATOLOGY | Facility: CLINIC | Age: 86
End: 2021-03-08

## 2021-03-08 DIAGNOSIS — L30.9 ECZEMA, UNSPECIFIED TYPE: Primary | ICD-10-CM

## 2021-03-08 RX ORDER — PREDNISONE 1 MG/1
TABLET ORAL
Qty: 60 TABLET | Refills: 1 | Status: SHIPPED | OUTPATIENT
Start: 2021-03-08

## 2021-03-08 NOTE — TELEPHONE ENCOUNTER
Pt wife called requesting a call back from Dr horta   [phone] she's also requesting a refill for prednisone 20mg

## 2021-03-08 NOTE — TELEPHONE ENCOUNTER
Staff, can someone print and fill out or have me complete dupixent forms on chart? Just spoke to wife who alerted me that insurance denied because we did not send in forms  I see multiple requests in media but somehow none of these appear to have made its was to my inbox  Thanks

## 2021-03-09 NOTE — TELEPHONE ENCOUNTER
Almas Baez,    I have faxed over the dupixent forms including the appeal form and all the clinic notes since October 2020      Thanks,  [facility]	[place]

## 2021-03-16 NOTE — TELEPHONE ENCOUNTER
Pt wife called to let Dr Hernan Roberts know that she called the insurance company and was told her husbands RX for Dupixent is covered  They are now waiting delivery of it  She was tiold a message will be sent to Dr Gerry Márquez making him aware

## 2021-03-17 NOTE — TELEPHONE ENCOUNTER
Patient notes Mary Frye tomorrow  Nurses visit scheduled Friday for teaching of injection  Erik visit not needed

## 2021-03-17 NOTE — TELEPHONE ENCOUNTER
Pt wife called saying they received the [address] and now she will like a call from Dr Luana Mistry telling them what to do with it

## 2021-03-18 DIAGNOSIS — L20.9 ATOPIC DERMATITIS, UNSPECIFIED TYPE: ICD-10-CM

## 2021-03-18 RX ORDER — DUPILUMAB 300 MG/2ML
INJECTION, SOLUTION SUBCUTANEOUS
Qty: 4 ML | Refills: 13 | Status: SHIPPED | OUTPATIENT
Start: 2021-03-18 | End: 2021-09-08 | Stop reason: SDUPTHER

## 2021-03-19 ENCOUNTER — OFFICE VISIT (OUTPATIENT)
Dept: DERMATOLOGY | Facility: CLINIC | Age: 86
End: 2021-03-19
Payer: MEDICARE

## 2021-03-19 DIAGNOSIS — L20.9 ATOPIC DERMATITIS, UNSPECIFIED TYPE: Primary | ICD-10-CM

## 2021-03-19 PROCEDURE — 96372 THER/PROPH/DIAG INJ SC/IM: CPT | Performed by: DERMATOLOGY

## 2021-03-19 NOTE — PROGRESS NOTES
PROCEDURE:  SUBCUTANEOUS BIOLOGIC INJECTION (Dupixent INJECTION)    Here for management of eczema  This is injection number 1  PROCEDURE NOTE:  After discussing potential procedure related risks including pain, bleeding, new infection, reactivation of latent infection, inefficacy, increased risk of malignancy, hypersensitivity reaction, injection site reaction, verbal consent was obtained  The to-be-treated area was wiped and cleansed with rubbing alcohol 70% and allowed to fully air dry for 3 minutes  The patients supplied medication was visually inspected to ensure it is a clear and colorless solution  Then, a total of 600 mL of Dupixent CONCENTRATION: 300 mg/mL  (Lot# [id number]; Expiration 07/2023) was injected subcutaneously into the following anatomic area: Left and right     Patient tolerated procedure well, with minimal pinpoint bleeding that was controlled with pressure  The area was bandaged with a Band-aid  Aftercare was reviewed  [address] demonstrated and administered medication to the right posterior upper arm with no complications  Patient tolerated injection well  Patient wife then demonstrated and administering injection to the left posterior upper arm with no complications  Patient waited 10 minutes in the waiting room with no reaction  Patient and wife were both advised if he was to have any reaction to call the office but if he has any signs of difficulty breathing they are to call [phone]

## 2021-04-02 NOTE — TELEPHONE ENCOUNTER
Discussed with wife   Much improved with dupixent  Will decrease prednisone to 5mg daily and call in 2 weeks

## 2021-04-16 ENCOUNTER — TELEPHONE (OUTPATIENT)
Dept: DERMATOLOGY | Facility: CLINIC | Age: 86
End: 2021-04-16

## 2021-04-16 NOTE — TELEPHONE ENCOUNTER
I spoke to wife  Doing relatively well on dupixent though head still itching  In instructed wife to taper to 5mg every other day for 2 weeks

## 2021-04-16 NOTE — TELEPHONE ENCOUNTER
Patients wife states that she gave the dupixent injection this morning and she wanted you to call her in regards to the steriod and weaning him off it  Patient was put on the prednisone for a rash  Please do not call during [id number] today any other time is okay

## 2021-05-04 ENCOUNTER — TELEPHONE (OUTPATIENT)
Dept: DERMATOLOGY | Facility: CLINIC | Age: 86
End: 2021-05-04

## 2021-05-04 NOTE — TELEPHONE ENCOUNTER
Spoke to patients wife  Itching is controlled  Will taper off steroid  Prednisone 2 5mg qod for one month then D/C  Discussed monitor for signs of secondary adrenal insufficiency

## 2021-05-04 NOTE — TELEPHONE ENCOUNTER
Patient's wife left a message stating she is calling to speak to Dr Nancy Ibarra as a follow up to patient's medication  Wife states she was to call in two weeks to talk to Dr Nancy Ibarra for a status update

## 2021-05-21 NOTE — TELEPHONE ENCOUNTER
The patient's wife had left a voicemail and I did return the patient's wife calls  All of the patient's wife's questions and concerns have been answered and she has no further questions at this time

## 2021-06-07 ENCOUNTER — TELEPHONE (OUTPATIENT)
Dept: DERMATOLOGY | Facility: CLINIC | Age: 86
End: 2021-06-07

## 2021-06-07 NOTE — TELEPHONE ENCOUNTER
I spoke with Mrs Adam Fry  He has an enlarging tender nodule on elbow  that is not responding to antibiotic prescribed by emergency room  We will need to get him in this week with me or any one  Is there any time after MOHS? ?

## 2021-06-11 ENCOUNTER — OFFICE VISIT (OUTPATIENT)
Dept: DERMATOLOGY | Facility: CLINIC | Age: 86
End: 2021-06-11
Payer: MEDICARE

## 2021-06-11 VITALS — TEMPERATURE: 98.5 F | WEIGHT: 198 LBS | BODY MASS INDEX: 31.82 KG/M2 | HEIGHT: 66 IN

## 2021-06-11 DIAGNOSIS — D48.5 NEOPLASM OF UNCERTAIN BEHAVIOR OF SKIN: Primary | ICD-10-CM

## 2021-06-11 PROCEDURE — 99213 OFFICE O/P EST LOW 20 MIN: CPT | Performed by: STUDENT IN AN ORGANIZED HEALTH CARE EDUCATION/TRAINING PROGRAM

## 2021-06-11 PROCEDURE — 88305 TISSUE EXAM BY PATHOLOGIST: CPT | Performed by: STUDENT IN AN ORGANIZED HEALTH CARE EDUCATION/TRAINING PROGRAM

## 2021-06-11 PROCEDURE — 11102 TANGNTL BX SKIN SINGLE LES: CPT | Performed by: STUDENT IN AN ORGANIZED HEALTH CARE EDUCATION/TRAINING PROGRAM

## 2021-06-11 RX ORDER — CEPHALEXIN 500 MG/1
500 CAPSULE ORAL 3 TIMES DAILY
COMMUNITY
Start: 2021-06-04 | End: 2021-06-11

## 2021-06-11 NOTE — PROGRESS NOTES
[facility] Follow Up Note    Patient Name: Regina Womack  Encounter Date: 6/11/2021    Today's Chief Concerns:  Andrés Henderson Concern #1:  Follow up lesion on elbow fell a month and developed lesion there after       Current Medications:    Current Outpatient Medications:     albuterol (2 5 mg/3 mL) 0 083 % nebulizer solution, Inhale 2 5 mg every 4 (four) hours as needed, Disp: , Rfl:     albuterol (VENTOLIN HFA) 90 mcg/act inhaler, Inhale 2 puffs every 6 (six) hours as needed, Disp: , Rfl:     aspirin 81 MG tablet, Take 81 mg by mouth daily in the early morning Last dose 6/12/20, Disp: , Rfl:     bimatoprost (LUMIGAN) 0 01 % ophthalmic drops, Administer 1 drop into the left eye daily at bedtime, Disp: , Rfl:     Brinzolamide-Brimonidine (SIMBRINZA) 1-0 2 % SUSP, Apply to eye 2 (two) times a day Left eye only, Disp: , Rfl:     cephalexin (KEFLEX) 500 mg capsule, Take 500 mg by mouth Three times a day, Disp: , Rfl:     Dupixent subcutaneous injection, Inject 2 syringes (600mg) subcutaneously on week 0, then inject 1 syringe (300mg) subcutaneously every 2 weeks starting on day 15, Disp: 4 mL, Rfl: 13    hydrochlorothiazide (HYDRODIURIL) 25 mg tablet, Take 25 mg by mouth daily, Disp: , Rfl:     Multiple Vitamins-Minerals (PRESERVISION AREDS 2 PO), Take by mouth 2 (two) times a day, Disp: , Rfl:     Netarsudil Dimesylate (Rhopressa) 0 02 % SOLN, Apply to eye, Disp: , Rfl:     rosuvastatin (CRESTOR) 20 MG tablet, Take 20 mg by mouth daily at bedtime  , Disp: , Rfl:     amLODIPine (NORVASC) 2 5 mg tablet, Take 2 5 mg by mouth 2 (two) times a day , Disp: , Rfl:     amoxicillin (AMOXIL) 500 mg capsule, TAKE 4 CAPSULES BY MOUTH ONE HOUR PRIOR TO APPOINTMENT, Disp: , Rfl:     benzonatate (TESSALON) 200 MG capsule, Take 1 capsule (200 mg total) by mouth 3 (three) times a day (Patient not taking: Reported on 4/19/2021), Disp: 60 capsule, Rfl: 1    Clobetasol Prop Emollient Base 0 05 % emollient cream, Apply topically 2 (two) times a day, Disp: 30 g, Rfl: 0    GLUCOSAMINE HCL PO, Take 1 tablet by mouth daily in the early morning , Disp: , Rfl:     omeprazole (PriLOSEC) 40 MG capsule, Take 1 capsule (40 mg total) by mouth daily, Disp: 30 capsule, Rfl: 0    predniSONE 5 mg tablet, Two tablets daily (Patient not taking: Reported on 6/11/2021), Disp: 60 tablet, Rfl: 1    triamcinolone (KENALOG) 0 1 % cream, Apply topically to affected areas twice a day (Patient not taking: Reported on 6/11/2021), Disp: 453 6 g, Rfl: 0    triamcinolone (KENALOG) 0 1 % ointment, Apply topically to the affected areas 2x/day  Do not apply on face and groin  (Patient not taking: Reported on 6/11/2021), Disp: 454 g, Rfl: 1    CONSTITUTIONAL:   Vitals:    06/11/21 1245   Temp: 98 5 °F (36 9 °C)   TempSrc: Tympanic   Weight: 89 8 kg (198 lb)   Height: 5' 6" (1 676 m)           Specific Alerts:    Have you been seen by a [facility] D[facility]atologist in the last 3 years? YES    Are you pregnant or planning to become pregnant? No    Are you currently or planning to be nursing or breast feeding? No    Allergies   Allergen Reactions    Bcg Live Other (See Comments)     Shakes, became very ill    Timolol Shortness Of Breath    Mitomycin Rash     Chronic rash which lasted for months    Xalatan [Latanoprost]      Back ache-severe    Brinzolamide-Brimonidine GI Intolerance    Carboxymethylcellulose     Dorzolamide GI Intolerance    Lipitor [Atorvastatin] GI Intolerance     Stomach pain    Other Rash     Cat Scan Dye       May we call your Preferred Phone number to discuss your specific medical information? YES    May we leave a detailed message that includes your specific medical information? YES    Have you traveled outside of the [facility] in the past 3 months? No    Do you currently have a pacemaker or defibrillator?  No    Do you have any artificial heart valves, joints, plates, screws, rods, stents, pins, etc? YES   - If Yes, were any placed within the last 2 years? 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 YES   - If Yes, for which procedure? Dental work    - If Yes, what medications to you require? Oral antibiotic     Are you taking any medications that cause you to bleed more easily ("blood thinners") No    Have you ever experienced a rapid heartbeat with epinephrine? No    Have you ever been treated with "gold" (gold sodium thiomalate) therapy? No    [facility] can help with wrinkles, "laugh lines," facial volume loss, "double chin," "love handles," age spots, and more  Are you interested in learning today about some of the skin enhancement procedures that we offer? (If Yes, please provide more detail) No    Review of Systems:  Have you recently had or currently have any of the following?     · Fever or chills: No  · Night Sweats: No  · Headaches: No  · Weight Gain: YES  · Weight Loss: No  · Blurry Vision: YES  · Nausea: No  · Vomiting: No  · Diarrhea: No  · Blood in Stool: No  · Abdominal Pain: No  · Itchy Skin: No  · Painful Joints: No  · Swollen Joints: No  · Muscle Pain: No  · Irregular Mole: No  · Sun Burn: No  · Dry Skin: No  · Skin Color Changes: No  · Scar or Keloid: No  · Cold Sores/Fever Blisters: No  · Bacterial Infections/MRSA: No  · Anxiety: No  · Depression: No  · Suicidal or Homicidal Thoughts: No      PSYCH: Normal mood and affect  EYES: Normal conjunctiva  ENT: Normal lips and oral mucosa  CARDIOVASCULAR: No edema  RESPIRATORY: Normal respirations  HEME/LYMPH/IMMUNO:  No regional lymphadenopathy except as noted below in ASSESSMENT AND PLAN BY DIAGNOSIS    FOCUSED ORGAN SYSTEM SKIN EXAM (SKIN)   Hair, Face Normal except as noted below in Assessment   Neck Normal except as noted below in Assessment   Right Arm/Hand/Fingers Normal except as noted below in Assessment   Left Arm/Hand/Fingers Normal except as noted below in Assessment 1  NEOPLASM OF UNCERTAIN BEHAVIOR OF SKIN    Physical Exam:   (Anatomic Location); (Size and Morphological Description); (Differential Diagnosis):  o A; Left Elbow; 1 1cm keratoic nodule, tender to touch  Differential diagnosis; Favor  Squamous Cell Carcinoma KA type   Pertinent Positives:   Pertinent Negatives: Additional History of Present Condition:  Present for one month now  Patient noted that one month ago he fell down and then lesion appeared fairly quickly  Patient notes site is tender to touch  Assessment and Plan:   I have discussed with the patient that a sample of skin via a "skin biopsy would be potentially helpful to further make a specific diagnosis under the microscope   Based on a thorough discussion of this condition and the management approach to it (including a comprehensive discussion of the known risks, side effects and potential benefits of treatment), the patient (family) agrees to implement the following specific plan:    o Procedure:  Skin Biopsy  After a thorough discussion of treatment options and risk/benefits/alternatives (including but not limited to local pain, scarring, dyspigmentation, blistering, possible superinfection, and inability to confirm a diagnosis via histopathology), verbal and written consent were obtained and portion of the rash was biopsied for tissue sample  See below for consent that was obtained from patient and subsequent Procedure Note  PROCEDURE SHAVE BIOPSY NOTE:     Performing Physician: Jef Tolentino Anatomic Location; Clinical Description with size (cm); Pre-Op Diagnosis:   o A; Left Elbow; Skin; Shave biopsy; 80year old male with a 1 1cm keratoic nodule, tender to touch  Differential diagnosis; Favor  Squamous Cell Carcinoma KA type      Post-op diagnosis: Same      Local anesthesia: 1% xylocaine with epi       Topical anesthesia: None     Hemostasis: Electrocautery  and Aluminum chloride       After obtaining informed consent at which time there was a discussion about the purpose of biopsy  and low risks of infection and bleeding  The area was prepped and draped in the usual fashion  Anesthesia was obtained with 1% lidocaine with epinephrine  A shave biopsy to an appropriate sampling depth was obtained with a sterile blade (such as a 15-blade or DermaBlade)  The resulting wound was covered with surgical ointment and bandaged appropriately  The patient tolerated the procedure well without complications and was without signs of functional compromise  Specimen has been sent for review by Dermatopathology  Standard post-procedure care has been explained and has been included in written form within the patient's copy of Informed Consent  INFORMED CONSENT DISCUSSION AND POST-OPERATIVE INSTRUCTIONS FOR PATIENT    I   RATIONALE FOR PROCEDURE  I understand that a skin biopsy allows the Dermatologist to test a lesion or rash under the microscope to obtain a diagnosis  It usually involves numbing the area with numbing medication and removing a small piece of skin; sometimes the area will be closed with sutures  In this specific procedure, sutures are not usually needed  If any sutures are placed, then they are usually need to be removed in 2 weeks or less  I understand that my Dermatologist recommends that a skin "shave" biopsy be performed today  A local anesthetic, similar to the kind that a dentist uses when filling a cavity, will be injected with a very small needle into the skin area to be sampled  The injected skin and tissue underneath "will go to sleep and become numb so no pain should be felt afterwards  An instrument shaped like a tiny "razor blade" (shave biopsy instrument) will be used to cut a small piece of tissue and skin from the area so that a sample of tissue can be taken and examined more closely under the microscope    A slight amount of bleeding will occur, but it will be stopped with direct pressure and a pressure bandage and any other appropriate methods  I understands that a scar will form where the wound was created  Surgical ointment will be applied to help protect the wound  Sutures are not usually needed  II   RISKS AND POTENTIAL COMPLICATIONS   I understand the risks and potential complications of a skin biopsy include but are not limited to the following:   Bleeding   Infection   Pain   Scar/keloid   Skin discoloration   Incomplete Removal   Recurrence   Nerve Damage/Numbness/Loss of Function   Allergic Reaction to Anesthesia   Biopsies are diagnostic procedures and based on findings additional treatment or evaluation may be required   Loss or destruction of specimen resulting in no additional findings    My Dermatologist has explained to me the nature of the condition, the nature of the procedure, and the benefits to be reasonably expected compared with alternative approaches  My Dermatologist has discussed the likelihood of major risks or complications of this procedure including the specific risks listed above, such as bleeding, infection, and scarring/keloid  I understand that a scar is expected after this procedure  I understand that my physician cannot predict if the scar will form a "keloid," which extends beyond the borders of the wound that is created  A keloid is a thick, painful, and bumpy scar  A keloid can be difficult to treat, as it does not always respond well to therapy, which includes injecting cortisone directly into the keloid every few weeks  While this usually reduces the pain and size of the scar, it does not eliminate it  I understand that photographs may be taken before and after the procedure  These will be maintained as part of the medical providers confidential records and may not be made available to me    I further authorize the medical provider to use the photographs for teaching purposes or to illustrate scientific papers, books, or lectures if in his/her judgment, medical research, education, or science may benefit from its use  I have had an opportunity to fully inquire about the risks and benefits of this procedure and its alternatives  I have been given ample time and opportunity to ask questions and to seek a second opinion if I wished to do so  I acknowledge that there have specifically been no guarantees as to the cosmetic results from the procedure  I am aware that with any procedure there is always the possibility of an unexpected complication  III  POST-PROCEDURAL CARE (WHAT YOU WILL NEED TO DO "AFTER THE BIOPSY" TO OPTIMIZE HEALING)     Keep the area clean and dry  Try NOT to remove the bandage or get it wet for the first 24 hours   Gently clean the area and apply surgical ointment (such as Vaseline petrolatum ointment, which is available "over the counter" and not a prescription) to the biopsy site for up to 2 weeks straight  This acts to protect the wound from the outside world   Sutures are not usually placed in this procedure  If any sutures were placed, return for suture removal as instructed (generally 1 week for the face, 2 weeks for the body)   Take Acetaminophen (Tylenol) for discomfort, if no contraindications  Ibuprofen or aspirin could make bleeding worse   Call our office immediately for signs of infection: fever, chills, increased redness, warmth, tenderness, discomfort/pain, or pus or foul smell coming from the wound  WHAT TO DO IF THERE IS ANY BLEEDING? If a small amount of bleeding is noticed, place a clean cloth over the area and apply firm pressure for ten minutes  Check the wound after 10 minutes of direct pressure  If bleeding persists, try one more time for an additional 10 minutes of direct pressure on the area    If the bleeding becomes heavier or does not stop after the second attempt, or if you have any other questions about this procedure, then please call your [facility]atologist by calling [phone] (SKIN)  I hereby acknowledge that I have reviewed and verified the site with my Dermatologist and have requested and authorized my Dermatologist to proceed with the procedure            Scribe Attestation    I,:  Mary Grace Singer MA am acting as a scribe while in the presence of the attending physician :       I,:  Wang Reyes MD personally performed the services described in this documentation    as scribed in my presence :

## 2021-06-11 NOTE — PATIENT INSTRUCTIONS
1  NEOPLASM OF UNCERTAIN BEHAVIOR OF SKIN    Physical Exam:    Assessment and Plan:   I have discussed with the patient that a sample of skin via a "skin biopsy would be potentially helpful to further make a specific diagnosis under the microscope   Based on a thorough discussion of this condition and the management approach to it (including a comprehensive discussion of the known risks, side effects and potential benefits of treatment), the patient (family) agrees to implement the following specific plan:    o Procedure:  Skin Biopsy  After a thorough discussion of treatment options and risk/benefits/alternatives (including but not limited to local pain, scarring, dyspigmentation, blistering, possible superinfection, and inability to confirm a diagnosis via histopathology), verbal and written consent were obtained and portion of the rash was biopsied for tissue sample  See below for consent that was obtained from patient and subsequent Procedure Note  PROCEDURE SHAVE BIOPSY NOTE:          After obtaining informed consent  at which time there was a discussion about the purpose of biopsy  and low risks of infection and bleeding  The area was prepped and draped in the usual fashion  Anesthesia was obtained with 1% lidocaine with epinephrine  A shave biopsy to an appropriate sampling depth was obtained with a sterile blade (such as a 15-blade or DermaBlade)  The resulting wound was covered with surgical ointment and bandaged appropriately  The patient tolerated the procedure well without complications and was without signs of functional compromise  Specimen has been sent for review by Dermatopathology  Standard post-procedure care has been explained and has been included in written form within the patient's copy of Informed Consent      INFORMED CONSENT DISCUSSION AND POST-OPERATIVE INSTRUCTIONS FOR PATIENT    I   RATIONALE FOR PROCEDURE  I understand that a skin biopsy allows the Dermatologist to test a lesion or rash under the microscope to obtain a diagnosis  It usually involves numbing the area with numbing medication and removing a small piece of skin; sometimes the area will be closed with sutures  In this specific procedure, sutures are not usually needed  If any sutures are placed, then they are usually need to be removed in 2 weeks or less  I understand that my Dermatologist recommends that a skin "shave" biopsy be performed today  A local anesthetic, similar to the kind that a dentist uses when filling a cavity, will be injected with a very small needle into the skin area to be sampled  The injected skin and tissue underneath "will go to sleep and become numb so no pain should be felt afterwards  An instrument shaped like a tiny "razor blade" (shave biopsy instrument) will be used to cut a small piece of tissue and skin from the area so that a sample of tissue can be taken and examined more closely under the microscope  A slight amount of bleeding will occur, but it will be stopped with direct pressure and a pressure bandage and any other appropriate methods  I understands that a scar will form where the wound was created  Surgical ointment will be applied to help protect the wound  Sutures are not usually needed      II   RISKS AND POTENTIAL COMPLICATIONS   I understand the risks and potential complications of a skin biopsy include but are not limited to the following:   Bleeding   Infection   Pain   Scar/keloid   Skin discoloration   Incomplete Removal   Recurrence   Nerve Damage/Numbness/Loss of Function   Allergic Reaction to Anesthesia   Biopsies are diagnostic procedures and based on findings additional treatment or evaluation may be required   Loss or destruction of specimen resulting in no additional findings    My Dermatologist has explained to me the nature of the condition, the nature of the procedure, and the benefits to be reasonably expected compared with alternative approaches  My Dermatologist has discussed the likelihood of major risks or complications of this procedure including the specific risks listed above, such as bleeding, infection, and scarring/keloid  I understand that a scar is expected after this procedure  I understand that my physician cannot predict if the scar will form a "keloid," which extends beyond the borders of the wound that is created  A keloid is a thick, painful, and bumpy scar  A keloid can be difficult to treat, as it does not always respond well to therapy, which includes injecting cortisone directly into the keloid every few weeks  While this usually reduces the pain and size of the scar, it does not eliminate it  I understand that photographs may be taken before and after the procedure  These will be maintained as part of the medical providers confidential records and may not be made available to me  I further authorize the medical provider to use the photographs for teaching purposes or to illustrate scientific papers, books, or lectures if in his/her judgment, medical research, education, or science may benefit from its use  I have had an opportunity to fully inquire about the risks and benefits of this procedure and its alternatives  I have been given ample time and opportunity to ask questions and to seek a second opinion if I wished to do so  I acknowledge that there have specifically been no guarantees as to the cosmetic results from the procedure  I am aware that with any procedure there is always the possibility of an unexpected complication  III  POST-PROCEDURAL CARE (WHAT YOU WILL NEED TO DO "AFTER THE BIOPSY" TO OPTIMIZE HEALING)     Keep the area clean and dry  Try NOT to remove the bandage or get it wet for the first 24 hours       Gently clean the area and apply surgical ointment (such as Vaseline petrolatum ointment, which is available "over the counter" and not a prescription) to the biopsy site for up to 2 weeks straight  This acts to protect the wound from the outside world   Sutures are not usually placed in this procedure  If any sutures were placed, return for suture removal as instructed (generally 1 week for the face, 2 weeks for the body)   Take Acetaminophen (Tylenol) for discomfort, if no contraindications  Ibuprofen or aspirin could make bleeding worse   Call our office immediately for signs of infection: fever, chills, increased redness, warmth, tenderness, discomfort/pain, or pus or foul smell coming from the wound  WHAT TO DO IF THERE IS ANY BLEEDING? If a small amount of bleeding is noticed, place a clean cloth over the area and apply firm pressure for ten minutes  Check the wound after 10 minutes of direct pressure  If bleeding persists, try one more time for an additional 10 minutes of direct pressure on the area  If the bleeding becomes heavier or does not stop after the second attempt, or if you have any other questions about this procedure, then please call your [facility]'s Dermatologist by calling [phone] (SKIN)  I hereby acknowledge that I have reviewed and verified the site with my Dermatologist and have requested and authorized my Dermatologist to proceed with the procedure

## 2021-06-15 NOTE — RESULT ENCOUNTER NOTE
Called patient and discussed results as follows:     Pathology consistent with SCC  Given location and type of skin cancer, recommend excision  Discussed at length, pt agreeable  Will [facility] DERM staff to assist with scheduling in procedure slot  Patient prefers Dr Pantera Willingham if available, they said if not, they would be okay with my schedule as well  Additionally, they will need this done at [facility]  All questions answered

## 2021-07-14 ENCOUNTER — PROCEDURE VISIT (OUTPATIENT)
Dept: DERMATOLOGY | Facility: CLINIC | Age: 86
End: 2021-07-14
Payer: MEDICARE

## 2021-07-14 VITALS — HEIGHT: 66 IN | TEMPERATURE: 98.1 F | BODY MASS INDEX: 31.66 KG/M2 | WEIGHT: 197 LBS

## 2021-07-14 DIAGNOSIS — C44.629 SQUAMOUS CELL CARCINOMA OF SKIN OF LEFT ELBOW: Primary | ICD-10-CM

## 2021-07-14 PROCEDURE — 88305 TISSUE EXAM BY PATHOLOGIST: CPT | Performed by: STUDENT IN AN ORGANIZED HEALTH CARE EDUCATION/TRAINING PROGRAM

## 2021-07-14 PROCEDURE — 11602 EXC TR-EXT MAL+MARG 1.1-2 CM: CPT | Performed by: DERMATOLOGY

## 2021-07-14 PROCEDURE — 12032 INTMD RPR S/A/T/EXT 2.6-7.5: CPT | Performed by: DERMATOLOGY

## 2021-07-14 NOTE — PROGRESS NOTES
PROCEDURE:  EXCISION WITH INTERMEDIATE LAYERED CLOSURE     Attending: Dr Fitzpatrick  Assistant: Elvia Saunders    Pre-Op Diagnosis: Squamous cell carcinoma  Post-Op Diagnosis: Same   Benign versus Malignant Malignant      Lesion Anatomic Location: Left elbow (Previous Accession Number: [id number])  Pre-op size: 1 2 cm  Size of defect:  1 6 cm (with 0 2 centimeter margins)  Final repaired wound length:  3 2 cm    Written and verbal, witnessed informed consent was obtained  I discussed that excision is a method of removing lesions both benign and malignant lesions  A portion of normal skin is often taken to ensure completeness of removal   I reviewed that procedure will include numbing the area,  cutting around and under defect, undermining tissue, and closing the wound with sutures both inside and out  These sutures are usually removed in 7 to 14 days  Risks (bleeding, pain, infection, scarring, recurrence) and benefits discussed  It was discussed with patient that every effort is made to minimize scar, but scarring is influenced also by extrinsic factor such as location, age and genetics  Time Out: performed:  yes  Correct patient: yes  Correct site per Clinic Report: yes  Correct site per Patient Report: yes    LOCAL ANESTHESIA: 1% xylocaine with epi     DESCRIPTION OF PROCEDURE: The patient was brought back into the procedure room, anesthetized locally, prepped and draped in the usual fashion  Using a #15 blade with a scalpel, the lesion was excised in elliptical fashion  The wound was  undermined in the  fascial plane  Hemostasis was achieved with light electrocoagulation  Purpose of undermining was to decrease wound tension and facilitate closure  The wound was closed with subcutaneous sutures as follows:    Deep suture:4-0 Vicryl      Epidermal edge closure was accomplished with superficial sutures as follows:    Superficial suture: 4-0 Ethilon  Superficial suture type:  Interrupted     Estimated blood loss less than 3ml  The patient tolerated the procedure well without any complications  The wound was cleaned with sterile saline, dried off, surgical vaseline ointment was applied, and the wound was covered  A pressure dressing was applied for stabilization and light pressure  The patient was given detailed oral and written instructions on postoperative care as detailed in consent  The patient left in good medical condition  POSTOP DISCUSSION DISCUSSION AND INSTRUCTIONS FOR PATIENT      Rationale for Procedure  A skin excision allows the dermatologist to remove a lesion  The procedure involves a local numbing medication and removing the entire lesion  Typically, the lesion is being removed because it is atypical, traumatized, or for significant appearance reasons  The area will be open like a brush burn and allowed to heal    There will be no sutures  Tissue is sent to pathologist who will reconfirm diagnosis and verify completeness of lesion removal     Description of Procedure  We would like to perform a skin excision today  A local anesthetic, similar to the kind that a dentist uses when filling a cavity, will be injected with a very small needle into the skin area to be sampled  The injected skin and tissue underneath should go to sleep and become numbed so that no further pain should be felt  A scalpel will be used to cut around the lesion and tissue will be submitted to pathology for examination  Depending on the diagnosis the lesion will be excised with a certain amount of normal skin to help assure completeness of lesion removal   The physician will discuss in advance the anticipated size and extent of removal    Bleeding will occur, but it will stopped with direct pressure, sutures, and electrocautery  Surgical Vaseline-type ointment will also applied after the procedure to help create a barrier between the wound and the outside world        Risks and Potential Complications  The advantage of a skin excision is that it allows us to remove a problem lesion quickly  Although this usually permits the lesion to heal as soon as possible with the least scarring, there are some risks and potential complications that include but are not limited to the following:  - Some bleeding is normal at time of procedure and some bleeding on gauze is normal  the first few days after surgery  Profuse bleeding and bleeding with swelling and pain should be reported as detailed  below  - Infection is uncommon in skin surgery  Infection should be reported and is indicated by pain, redness, and discharge of purulent material   - Some dull to at time sharp pain could occur initially the day after surgery  Persistent pain or increasing pain days after surgery is not expected and should be reported  - Every effort is made to minimize scar, but location, size, and genetics do play a role in scar appearance  A surgical wound does not achieve its optimal appearance until 6 months  There are several treatments available if scarring would be problematic including scar creams, silicone pad, laser and scar revision   - Skin discoloration can occur especially in people of color  Its important to avoid sun on wound in first 6 months after surgery  Treatment is available if pigment is problematic   - Incomplete removal of the lesion or recurrence of lesion can occur and this would then require further treatment and more surgery   - Nerve Damage/Numbness/Loss of Function is very rare, but is most likely to occur if lesion is large or if it is in a high risk location  - Allergic Reaction to lidocaine is rare  More commonly,  epinephrine is used  with the lidocaine  Occasionally, epinephrine (aka adrenalin) may cause a brief  feeling of anxiety or jitteriness  - The person at the microscope  (pathologist) may provide additional information that was unexpected   This unexpected finding could provoke the need for additional treatment or evaluation  What You Will Need to Do After the Procedure  1  Keep the area clean and dry the first day  Try NOT to remove the bandage for the first day  2  Gently clean the area with soap and water and apply Vaseline ointment (this is over the counter and not a prescription) to the excision  site for up to 2 weeks  3  Apply a clean appropriately sized bandage to area  Gauze and paper tape are recommended for sensitive skin  4  Return for suture removal as instructed (generally 1 week for the face, 2 weeks for the body)  5  Take Acetaminophen (Tylenol) for discomfort, if no contraindications  Do NOT take Ibuprofen or aspirin unless specifically told to do so by your Dermatologist because these medications can make bleeding worse  6  Call our office immediately for signs of infection: fever, chills, increased redness, warmth, tenderness, discomfort/pain, or pus or foul smell coming from the wound  If bleeding is noticed, place a clean cloth over the area and apply firm pressure for thirty minutes  Check the wound ONLY after 30 minutes of direct pressure; do not cheat and sneak a peak, as that does not count  If bleeding persists after 30 minutes of legitimate direct pressure, then try one more round of direct pressure for an additional 10 minutes to the area  Should the bleeding become heavier or not stop after the second attempt, call [facility]ermat[facility] directly at [phone] (SKIN) or, if after hours, go to your local Emergency Room/Emergency Department        Scribe Attestation    I,:  Eitan Nina MA am acting as a scribe while in the presence of the attending physician :       I,:  Adeline Bryan MD personally performed the services described in this documentation    as scribed in my presence :

## 2021-07-14 NOTE — PATIENT INSTRUCTIONS
POSTOP DISCUSSION DISCUSSION AND INSTRUCTIONS FOR PATIENT      Rationale for Procedure  A skin excision allows the dermatologist to remove a lesion  The procedure involves a local numbing medication and removing the entire lesion  Typically, the lesion is being removed because it is atypical, traumatized, or for significant appearance reasons  The area will be open like a brush burn and allowed to heal    There will be no sutures  Tissue is sent to pathologist who will reconfirm diagnosis and verify completeness of lesion removal     Description of Procedure  We would like to perform a skin excision today  A local anesthetic, similar to the kind that a dentist uses when filling a cavity, will be injected with a very small needle into the skin area to be sampled  The injected skin and tissue underneath should go to sleep and become numbed so that no further pain should be felt  A scalpel will be used to cut around the lesion and tissue will be submitted to pathology for examination  Depending on the diagnosis the lesion will be excised with a certain amount of normal skin to help assure completeness of lesion removal   The physician will discuss in advance the anticipated size and extent of removal    Bleeding will occur, but it will stopped with direct pressure, sutures, and electrocautery  Surgical Vaseline-type ointment will also applied after the procedure to help create a barrier between the wound and the outside world  Risks and Potential Complications  The advantage of a skin excision is that it allows us to remove a problem lesion quickly  Although this usually permits the lesion to heal as soon as possible with the least scarring, there are some risks and potential complications that include but are not limited to the following:  - Some bleeding is normal at time of procedure and some bleeding on gauze is normal  the first few days after surgery    Profuse bleeding and bleeding with swelling and pain should be reported as detailed  below  - Infection is uncommon in skin surgery  Infection should be reported and is indicated by pain, redness, and discharge of purulent material   - Some dull to at time sharp pain could occur initially the day after surgery  Persistent pain or increasing pain days after surgery is not expected and should be reported  - Every effort is made to minimize scar, but location, size, and genetics do play a role in scar appearance  A surgical wound does not achieve its optimal appearance until 6 months  There are several treatments available if scarring would be problematic including scar creams, silicone pad, laser and scar revision   - Skin discoloration can occur especially in people of color  Its important to avoid sun on wound in first 6 months after surgery  Treatment is available if pigment is problematic   - Incomplete removal of the lesion or recurrence of lesion can occur and this would then require further treatment and more surgery   - Nerve Damage/Numbness/Loss of Function is very rare, but is most likely to occur if lesion is large or if it is in a high risk location  - Allergic Reaction to lidocaine is rare  More commonly,  epinephrine is used  with the lidocaine  Occasionally, epinephrine (aka adrenalin) may cause a brief  feeling of anxiety or jitteriness  - The person at the microscope  (pathologist) may provide additional information that was unexpected  This unexpected finding could provoke the need for additional treatment or evaluation  What You Will Need to Do After the Procedure  1  Keep the area clean and dry the first day  Try NOT to remove the bandage for the first day  2  Gently clean the area with soap and water and apply Vaseline ointment (this is over the counter and not a prescription) to the excision  site for up to 2 weeks  3  Apply a clean appropriately sized bandage to area    Gauze and paper tape are recommended for sensitive skin  4  Return for suture removal as instructed (generally 1 week for the face, 2 weeks for the body)  5  Take Acetaminophen (Tylenol) for discomfort, if no contraindications  Do NOT take Ibuprofen or aspirin unless specifically told to do so by your Dermatologist because these medications can make bleeding worse  6  Call our office immediately for signs of infection: fever, chills, increased redness, warmth, tenderness, discomfort/pain, or pus or foul smell coming from the wound  If bleeding is noticed, place a clean cloth over the area and apply firm pressure for thirty minutes  Check the wound ONLY after 30 minutes of direct pressure; do not cheat and sneak a peak, as that does not count  If bleeding persists after 30 minutes of legitimate direct pressure, then try one more round of direct pressure for an additional 10 minutes to the area  Should the bleeding become heavier or not stop after the second attempt, call [facility] directly at [phone] (SKIN) or, if after hours, go to your local Emergency Room/Emergency Department

## 2021-07-19 NOTE — RESULT ENCOUNTER NOTE
DERMATOPATHOLOGY RESULT NOTE    Results reviewed by ordering physician  Called patient to personally discuss results  No answer, left voicemail with result  Instructions for Clinical Derm Team:   (remember to route Result Note to appropriate staff):    None    Result & Plan by Specimen:    Specimen A: malignant  Plan: margins clear    Final Diagnosis  A  Skin, left elbow, excision:     -Prior procedure site changes present      -Residual squamous cell carcinoma not seen      -Diffuse actinic damage present

## 2021-07-28 ENCOUNTER — OFFICE VISIT (OUTPATIENT)
Dept: DERMATOLOGY | Facility: CLINIC | Age: 86
End: 2021-07-28

## 2021-07-28 DIAGNOSIS — Z48.02 VISIT FOR SUTURE REMOVAL: Primary | ICD-10-CM

## 2021-07-28 PROCEDURE — RECHECK: Performed by: DERMATOLOGY

## 2021-07-28 NOTE — PROGRESS NOTES
Suture removal    Date/Time: 7/28/2021 9:56 AM  Performed by: Keenan Downing MA  Authorized by: Iain Butler MD   Universal Protocol:  Consent: Verbal consent obtained  Consent given by: patient  Timeout called at: 7/28/2021 9:56 AM   Patient understanding: patient states understanding of the procedure being performed  Patient consent: the patient's understanding of the procedure matches consent given  Procedure consent: procedure consent matches procedure scheduled        Patient location:  Other (comment)  Location:     Laterality:  Left    Location:  Upper extremity    Upper extremity location:  Elbow    Elbow location:  L elbow  Procedure details: Tools used:  Scissors and tweezers    Wound appearance:  No sign(s) of infection, good wound healing and clean    Number of sutures removed:  7  Post-procedure details:     Post-removal:  Band-Aid applied (With Vaseline )    Patient tolerance of procedure:   Tolerated well, no immediate complications

## 2021-08-10 ENCOUNTER — FOLLOW UP (OUTPATIENT)
Dept: URBAN - METROPOLITAN AREA CLINIC 6 | Facility: CLINIC | Age: 86
End: 2021-08-10

## 2021-08-10 DIAGNOSIS — H40.1122: ICD-10-CM

## 2021-08-10 PROCEDURE — 92012 INTRM OPH EXAM EST PATIENT: CPT

## 2021-08-10 PROCEDURE — 92020 GONIOSCOPY: CPT

## 2021-08-10 ASSESSMENT — VISUAL ACUITY: OS_CC: 20/40-2

## 2021-08-24 ENCOUNTER — TELEPHONE (OUTPATIENT)
Dept: DERMATOLOGY | Age: 86
End: 2021-08-24

## 2021-09-08 DIAGNOSIS — L20.9 ATOPIC DERMATITIS, UNSPECIFIED TYPE: ICD-10-CM

## 2021-09-10 RX ORDER — DUPILUMAB 300 MG/2ML
300 INJECTION, SOLUTION SUBCUTANEOUS
Qty: 4 ML | Refills: 13 | Status: SHIPPED | OUTPATIENT
Start: 2021-09-10 | End: 2022-03-28 | Stop reason: SDUPTHER

## 2021-09-21 ENCOUNTER — TELEPHONE (OUTPATIENT)
Dept: DERMATOLOGY | Facility: CLINIC | Age: 86
End: 2021-09-21

## 2021-09-21 NOTE — TELEPHONE ENCOUNTER
[facility] PA request for Dupixent 300 MG/2ML Syringes received via fax, please review document scanned into media and advised if this has been taken care of   kelsey

## 2021-09-24 ENCOUNTER — TELEPHONE (OUTPATIENT)
Dept: DERMATOLOGY | Facility: CLINIC | Age: 86
End: 2021-09-24

## 2021-09-24 NOTE — TELEPHONE ENCOUNTER
Received a PA request from [facility] for Dupixent medication  Paperwork scanned in pt's chart

## 2021-09-27 NOTE — TELEPHONE ENCOUNTER
Austin Ramirez,    This is Dr Vick Cross patient actually  I am cc'ing him      Thanks,  [facility]

## 2021-10-01 NOTE — TELEPHONE ENCOUNTER
Called [facility], spoke with Evonne to verify on pt's approval  At this time we do not have to fill out or sign more paperwork because his Dupixent medication has been approved for one year from 3/20/21 to 3/20/22  They have an approval letter on file

## 2021-10-20 ENCOUNTER — FOLLOW UP (OUTPATIENT)
Dept: URBAN - METROPOLITAN AREA CLINIC 6 | Facility: CLINIC | Age: 86
End: 2021-10-20

## 2021-10-20 DIAGNOSIS — H40.1122: ICD-10-CM

## 2021-10-20 DIAGNOSIS — H18.513: ICD-10-CM

## 2021-10-20 PROCEDURE — 1036F TOBACCO NON-USER: CPT

## 2021-10-20 PROCEDURE — 92012 INTRM OPH EXAM EST PATIENT: CPT

## 2021-10-20 PROCEDURE — G8427 DOCREV CUR MEDS BY ELIG CLIN: HCPCS

## 2021-10-20 ASSESSMENT — TONOMETRY: OS_IOP_MMHG: 13

## 2021-10-20 ASSESSMENT — VISUAL ACUITY: OS_CC: 20/60

## 2021-11-04 ENCOUNTER — FOLLOW UP (OUTPATIENT)
Dept: URBAN - METROPOLITAN AREA CLINIC 6 | Facility: CLINIC | Age: 86
End: 2021-11-04

## 2021-11-04 DIAGNOSIS — H40.1122: ICD-10-CM

## 2021-11-04 DIAGNOSIS — H18.513: ICD-10-CM

## 2021-11-04 PROCEDURE — G8428 CUR MEDS NOT DOCUMENT: HCPCS

## 2021-11-04 PROCEDURE — 1036F TOBACCO NON-USER: CPT

## 2021-11-04 PROCEDURE — 76514 ECHO EXAM OF EYE THICKNESS: CPT

## 2021-11-04 PROCEDURE — 92012 INTRM OPH EXAM EST PATIENT: CPT

## 2021-11-04 ASSESSMENT — PACHYMETRY: OS_CT_UM: 549

## 2021-11-04 ASSESSMENT — TONOMETRY: OS_IOP_MMHG: 15

## 2021-11-04 ASSESSMENT — VISUAL ACUITY: OS_CC: 20/60-2

## 2021-12-14 ENCOUNTER — FOLLOW UP (OUTPATIENT)
Dept: URBAN - METROPOLITAN AREA CLINIC 6 | Facility: CLINIC | Age: 86
End: 2021-12-14

## 2021-12-14 DIAGNOSIS — H18.513: ICD-10-CM

## 2021-12-14 DIAGNOSIS — H44.521: ICD-10-CM

## 2021-12-14 DIAGNOSIS — H35.3131: ICD-10-CM

## 2021-12-14 DIAGNOSIS — H40.1122: ICD-10-CM

## 2021-12-14 PROCEDURE — 2019F DILATED MACUL EXAM DONE: CPT

## 2021-12-14 PROCEDURE — 1036F TOBACCO NON-USER: CPT

## 2021-12-14 PROCEDURE — 92014 COMPRE OPH EXAM EST PT 1/>: CPT

## 2021-12-14 PROCEDURE — 92020 GONIOSCOPY: CPT

## 2021-12-14 PROCEDURE — G8427 DOCREV CUR MEDS BY ELIG CLIN: HCPCS

## 2021-12-14 PROCEDURE — 92083 EXTENDED VISUAL FIELD XM: CPT

## 2021-12-14 PROCEDURE — 92202 OPSCPY EXTND ON/MAC DRAW: CPT

## 2021-12-14 PROCEDURE — 92133 CPTRZD OPH DX IMG PST SGM ON: CPT

## 2021-12-14 ASSESSMENT — TONOMETRY
OS_IOP_MMHG: 7
OS_IOP_MMHG: 12

## 2021-12-14 ASSESSMENT — VISUAL ACUITY
OS_CC: 20/40+1
OU_CC: J2

## 2021-12-20 ENCOUNTER — TELEPHONE (OUTPATIENT)
Dept: DERMATOLOGY | Facility: CLINIC | Age: 86
End: 2021-12-20

## 2022-01-17 ENCOUNTER — TELEPHONE (OUTPATIENT)
Dept: DERMATOLOGY | Facility: CLINIC | Age: 87
End: 2022-01-17

## 2022-01-19 NOTE — TELEPHONE ENCOUNTER
I have left the patient a second message trying to return his voicemail to see how we can assist him

## 2022-01-20 ENCOUNTER — OFFICE VISIT (OUTPATIENT)
Dept: DERMATOLOGY | Facility: CLINIC | Age: 87
End: 2022-01-20
Payer: MEDICARE

## 2022-01-20 DIAGNOSIS — L82.1 SEBORRHEIC KERATOSIS: ICD-10-CM

## 2022-01-20 DIAGNOSIS — L20.9 ATOPIC DERMATITIS, UNSPECIFIED TYPE: Primary | ICD-10-CM

## 2022-01-20 PROCEDURE — 99213 OFFICE O/P EST LOW 20 MIN: CPT | Performed by: DERMATOLOGY

## 2022-01-20 RX ORDER — LOTEPREDNOL ETABONATE 3.8 MG/G
GEL OPHTHALMIC
COMMUNITY
Start: 2021-12-08

## 2022-01-20 NOTE — TELEPHONE ENCOUNTER
I have left a third message  If patient returns call please see how we can assist with medication question he has on voicemail

## 2022-01-20 NOTE — PATIENT INSTRUCTIONS
ATOPIC DERMATITIS ("childhood Eczema")      Assessment and Plan:  Based on a thorough discussion of this condition and the management approach to it (including a comprehensive discussion of the known risks, side effects and potential benefits of treatment), the patient (family) agrees to implement the following specific plan:     Stop dupixent at this time - HOLD last 2 injections and if rash flares up can restart and contact the office  Assessment and Plan:   Atopic Dermatitis is a chronic, itchy skin condition that is very common in children but may occur at any age  It is also known as eczema or atopic eczema   It is the most common form of dermatitis  Atopic dermatitis usually occurs in people who have an atopic tendency    This means they may develop any or all of these closely linked conditions: Atopic dermatitis, asthma, hay fever (allergic rhinitis), eosinophilic esophagitis, and gastroenteritis  Often these conditions run within families with a parent, child or sibling also affected  A family history of asthma, eczema or hay fever is particularly useful in diagnosing atopic dermatitis in infants  Atopic dermatitis arises because of a complex interaction of genetic and environmental factors  These include defects in skin barrier function making the skin more susceptible to irritation by soap and other contact irritants, the weather, temperature and non-specific triggers  There is also an element of immune system dysregulation that is often present  By definition, it is chronic and has a "waxing-waning" nature; flares should be expected but with good education and treatment strategies can be minimized  Some specific tips we discussed:   Dry skin care   Usingonly mild cleansers (hypoallergenic and without fragrances) and fragrance free detergent (not unscented products which contain a masking agent); we discussed avoiding irritants/fragranced products     The importance of regular application of moisturizers daily (at least 3 times a day)   The known and theoretical side effects of steroids at length, including but not limited to atrophy of skin and increased pressure in eye (glaucoma) and clouding of the eye's lens (cataracts) if used in or around the eye for extended durations   The specific over-the-counter interventions and medications   Side effects, risks and benefits of topical and oral medications discussed   After lengthy discussion of etiology and treatment options, we decided to implement the following personalized treatment plan:      YOUR PERSONALIZED ECZEMA ACTION PLAN    FOR ACUTE FLARING    1) Antimicrobials  a) None    b) Clindamycin 75mg/5mL solution:  Take by mouth THREE TIMES A DAY for 10 days straight to help reduce the amount of presumed Staph aureus colonizing the skin  c) Bleach baths:  Soak in a bleach bath (recipe provided via email) about 3 times a week for about 5-10 minutes a day; crucially, make sure to rinse thoroughly with fresh water and apply moisturizer within 3 minutes of toweling off gently  2) Anti-Inflammatories  a) During periods of acute flaring, apply the Hydrocortisone 2 5% ointment to the face and neck TWICE A DAY for 2 weeks straight  To give you an idea of how much medication to use: You should be using at least a single full 30-gram tube of this medication EACH WEEK  b) During periods of acute flaring, apply the Triamcinolone 0 1% ointment to the body TWICE A DAY for 2 weeks straight  To give you an idea of how much medication to use: You should be using at least two full 30-gram tubes of this medication EACH WEEK  Do NOT apply this stronger medication to the face or groin area as the skin is too thin and at greater risk for side effects  3) Moisturizers  a) Apply Eucerin cream or Aquaphor ointment at least 3 times a day    It is best to use moisturizers and prescription medications at DIFFERENT times during the day (ideally, about 30 minutes apart)  If you must apply your prescription medication and your moisturizer at the same time, then ALWAYS apply the prescription medication FIRST (i e , directly to the skin); as we discussed, this allows the prescription medication to reach the skin without being blocked by the thick moisturizer! 4) Oral Antihistamines  a) Cetirizine (Zyrtec): Take 5 mL (1 teaspoon) of 5mg/5mL oral suspension EACH MORNING through allergy season  b) Hydroxyzine (Atarax): Take 5 mL (1 teaspoon) of 12 5mg/5mL oral solution about 1 hour before bedtime for the next 3-5 evenings to help reduce scratching  FOR CHRONIC MAINTENANCE    1) Antimicrobials  a) None    b) Bleach baths:  Soak in a bleach bath (recipe provided via email) about 3 times a week for about 5-10 minutes a day; crucially, make sure to rinse thoroughly with fresh water and apply moisturizer within 3 minutes of toweling off gently  2) Anti-Inflammatories  a) Once in the chronic maintenance phase apply Hydrocortisone 2 5% ointment to the face ONCE A DAY and only on Mondays, Wednesdays and Fridays to help decrease the inflammation; try to decrease to [facility] and Fridays if possible  b) Once in the chronic maintenance phase apply Triamcinolone 0 1% ointment to the body ONCE A DAY and only on Mondays, Wednesdays and Fridays to help decrease the inflammation; try to decrease to [facility] and Fridays if possible  Do NOT apply this stronger medication to your face or groin area as the skin is too thin and at greater risk for side effects  c) Apply crisaborole 2% Calvindisha Chavez) ointment TWICE A DAY on Tuesdays, Thursdays, Saturdays and Sundays (i e , the days you are not applying a topical steroid) to both the face/neck and body    As we discussed, this product is approved for the topical treatment of mild-to-moderate eczema in patients 3years of age and older; use of the medication in kids younger than 2 is considered off label and has not been formally studied  Burning and stinging are the most commonly reported side effects of this medication  Rarely, this product has been known to cause hives and hypersensitivity reactions; discontinue its use if you develop severe itching, swelling, or redness in the area of application  3) Moisturizers  a) Continue to apply Eucerin cream or Aquaphor ointment at least 3 times a day  It is best to use moisturizers and prescription medications at DIFFERENT times during the day (ideally, about 30 minutes apart)  If you must apply your prescription medication and your moisturizer at the same time, then ALWAYS apply the prescription medication FIRST (i e , directly to the skin); as we discussed, this allows the prescription medication to reach the skin without being blocked by the thick moisturizer! 4) Oral Antihistamines  Take Cetirizine (Zyrtec): Take 5 mL (1 teaspoon) of 5mg/5mL oral suspension through allergy season  EDUCATION AS INTERVENTION! WHAT IS ATOPIC DERMATITIS? Atopic dermatitis (also called eczema) is a condition of the skin where the skin is dry, red, and itchy  The main function of the skin is to provide a barrier from the environment and is also the first defense of the immune system  In atopic dermatitis the skin barrier is decreased or disrupted, and the skin is easily irritated  As a result, moisture escapes the skin more easily, and environmental allergens and microbes can enter the skin more easily  Consequently, the skin's immune system is altered  If there are increased allergic type cells in the skin, the skin may become red and hyper-excitable   This leads to itching and a subsequent rash  WHY DO PEOPLE GET ATOPIC DERMATITIS? There is no single answer because many factors are involved  It is likely a combination of genetic makeup and environmental triggers and/or exposures   Excessive drying or sweating of the skin, Irritating soaps, dust mites, and pet dander are some of the more common triggers  There is no blood test that can be done to confirm this diagnosis  The history and appearance of the skin is usually sufficient for a diagnosis  However, in some cases if the rash does not fit with the history or respond appropriately to treatment, a skin biopsy may be helpful  Many children do outgrow atopic dermatitis or get better; however, many continue to have sensitive skin into adulthood  Asthma and hay fever are often seen in many patients with atopic dermatitis; however, asthma flares do not necessarily occur at the same time as skin flares  PREVENTING FLARES OF ATOPIC DERMATITIS  The first step is to maintain the skin's barrier function  Keep the skin well moisturized  Avoid irritants and triggers  Use prescribed medicine when there are red or rough areas to help the skin to return to normal as quickly as possible  Try to limit scratching  If you keep the skin well moisturized, and avoid coming in contact with things you know irritate your child's skin, there will be less flares  However, some flares of atopic dermatitis are beyond your control  You should work with your health care provider to come up with a plan that minimizes flares while minimizing long term use of medications that suppress the immune system  WHAT ARE SOME OF THE TRIGGERS? Triggers are different for different people  The most common triggers are:   Heat and sweat for some individuals, cold weather for others   House dust mites, pet fur   Wool; synthetic fabrics like nylon; dyed fabrics   Tobacco smoke    Fragrances in: shampoos, soaps, lotions, laundry detergents, fabric softeners   Saliva or prolonged exposure to water  WHAT ABOUT FOOD ALLERGIES? This is a very controversial topic, as many believe that food allergies are responsible for skin flares   In some cases, specific foods may cause worsening of atopic dermatitis; however this occurs in a minority of cases and usually happens within a few hours of ingestion  While food allergy is more common in children with eczema, foods are specific triggers for flares in only a small percentage of children  If you notice that the skin flares after certain foods you can see if eliminating one food at time makes a difference, as long as your child can still enjoy a well-balanced diet  There are blood (RAST) and skin (PRICK) tests that can check for allergies, but they are often positive in children who are not truly allergic  Therefore it is important that you work with your allergist and dermatologist to determine which foods are relevant and causing true symptoms  Extreme food elimination diets without the guidance of your doctor, which have become more popular in recent years, may even result in worsening of the skin rash due to malnutrition and avoidance of essential nutrients  TREATMENT  Treatments are aimed at minimizing exposure to irritating factors and decreasing  the skin inflammation which results in an itchy rash  There are many different treatment options, which depend on your child's rash, its location, and severity  Topical treatments include corticosteroids and steroid-like creams such as Protopic, Elidel, and Eucrisa, which are believed to not thin the skin  Please read the discussions below regarding risks and benefits of all of these creams  Occasionally bacterial or viral infections can occur which flare the skin and require oral and/or topical antibiotics or antivirals  In some cases bleach baths 2-3 times weekly can be helpful to prevent recurrent infection  For severe disease, strong oral medications such as corticosteroids, methotrexate or azathioprine (Imuran) may be needed  These medications require close monitoring and follow-up   You should discuss the risks/ benefits/alternatives of these medications with your health care provider to come up with the best treatment plan for your child  1) Use moisturizer all over the entire body at least THREE TIMES a day  This keeps the skin moisturized to restore the barrier function  Find a cream or ointment that your child likes - this is the most important  The medicines do not work in the bottle  The thicker the moisturizer, generally the better barrier it provides  Ointments often moisturize better than creams; and creams work better than lotions  Lotions are more useful during the summer when thick greasy ointments are uncomfortable  If you put moisturizer on the skin after bathing, while the skin is damp, it is twice as effective  The moisturizer provides a seal holding the water in the skin  You may bathe your child in warm - not hot - water, for short periods of time (no more than 5-10 minutes at a time) once a day if they like  Lightly pat your child dry with a towel and, while the skin is still damp, (within 3 minutes) apply a moisturizer from head to toe  If your child is using a medicated cream, apply it and allow it to absorb completely BEFORE you apply the moisturizer  2) Apply the prescription medication TWICE A DAY to only the red, rough areas on the skin OR [place]  Put the medication on your fingers and gently rub it into the areas  Usually the medicine will help an area within a few days time  Try to put the medicine on for two days after you have noticed that the redness is no longer present; this will help the redness from returning  The severity of the rash and the strength and usage of the medication will determine how quickly you see improvement  It is important that you do not overuse steroid creams, and if you notice a thin, shiny appearance to the skin or broken blood vessels, you should stop using the cream and consult your health care provider regarding possible overuse/overthinning of the skin    The face, armpits and groin have particularly thin and sensitive skin and are therefore most at risk for bad results if steroids are over-used in these sites  3) Avoid triggers  Some children have specific things that trigger itching and rashes, while others may have none that can be identified  It may require a little bit of trial and error to see what applies to your child  Also, triggers can change over time for your child  The most common triggers are listed above; start with these  Avoid the use of fabric softeners in the washing machine or dryer sheets (unless they are fragrance-free)  Try to use laundry detergents, soaps and shampoos that are fragrance-free  You may find it helpful to double-rinse your clothes  Some children are sensitive to house dust mites and they may benefit from a plastic mattress wrap  While food allergy is more common in children with eczema, foods are specific triggers for flares in only a small percentage of children  If you notice that the skin flares after certain foods you can see if eliminating one food at time makes a difference, as long as your child can still enjoy a well-balanced diet  4) Consider using a medication like an anti-histamine by mouth to help control the itching  Scratching only makes the skin more reactive and the barrier function even more disrupted  It can cause both children and their parents to lose sleep! There are different types of anti-itch medications  Some cause more drowsiness than others  Both types are acceptable depending on your child and your preference  Start with Benadryl and if that does not work, ask for a prescription antihistamine      5) About the prescription creams:  Corticosteroid creams and ointments (generally things with "-one" or "tita" on the end of their names): The strength of the cream or ointment depends on the name of the active ingredient  The numbers at the end do not indicate the relative strength    Thus triamcinolone 0 1% ointment, considered a mid-strength corticosteroid, is much stronger than hydrocortisone 1% even though the number following the name is much lower  Topical corticosteroids are very effective in treating atopic dermatitis  When used in the manner prescribed (to rashy areas of skin and for no more than a few weeks at a time to any one area) they are very safe  These are corticosteroids and are anti-inflammatory, not the anabolic steroids like those used illegally by some athletes  Topical non-steroid creams and ointments (immunomodulators): These creams and ointments are also called topical calcineurin inhibitors (TCIs)  These include Protopic ointment and Elidel cream  Crisaborole 2% Asha Shannon) is a prescription ointment that targets an enzyme called PDE4 (phosphodiesterase 4)  It is used on the skin topically to treat mild-to-moderate eczema in adults and children 3years of age and older  In total, these nonsteroidal prescriptions are used to help decrease itching and redness in the skin  They are not as strong as most steroid creams; however, it is believed that they do not thin the skin when overused  They are generally used as second-line medications, though they may be used alone or in conjunction with topical steroids  In sensitive areas such as the face, underarms or groin, they are often recommended  They can sting inflamed skin, but are generally well tolerated once the skin is healing  The FDA placed a black-box warning on both Elidel and Protopic in 2006 based on animal studies using the medications  Some animals developed skin cancer and lymphoma  Subsequently, the FDA released a statement that there is no causal relationship between the two medications and cancer  Because of this concern, there are ongoing studies to evaluate this relationship in humans  So far, there are studies that support the safety of these medications    One showed that the rates of cancer in patient using these medications topically were less than the rates of the general population and another showed that in patient's using the medication over a large area of the body, the levels of the medication in the blood was undetectable  As for Eucrisa, this product is only approved for the topical treatment of mild-to-moderate eczema in patients 3years of age and older; use of the medication in kids younger than 2 is considered off label and has not been formally studied  Burning and stinging are the most commonly reported side effects of this medication  Rarely, this product has been known to cause hives and hypersensitivity reactions; discontinue its use if you develop severe itching, swelling, or redness in the area of application  SEBORRHEIC KERATOSIS; NON-INFLAMED      Assessment and Plan:  Based on a thorough discussion of this condition and the management approach to it (including a comprehensive discussion of the known risks, side effects and potential benefits of treatment), the patient (family) agrees to implement the following specific plan:     Reassured benign    Seborrheic Keratosis  A seborrheic keratosis is a harmless warty spot that appears during adult life as a common sign of skin aging  Seborrheic keratoses can arise on any area of skin, covered or uncovered, with the usual exception of the palms and soles  They do not arise from mucous membranes  Seborrheic keratoses can have highly variable appearance  Seborrheic keratoses are extremely common  It has been estimated that over 90% of adults over the age of 61 years have one or more of them  They occur in males and females of all races, typically beginning to erupt in the 35s or 45s  They are uncommon under the age of 21 years  The precise cause of seborrhoeic keratoses is not known  Seborrhoeic keratoses are considered degenerative in nature  As time goes by, seborrheic keratoses tend to become more numerous   Some people inherit a tendency to develop a very large number of them; some people may have hundreds of them  The name "seborrheic keratosis" is misleading, because these lesions are not limited to a seborrhoeic distribution (scalp, mid-face, chest, upper back), nor are they formed from sebaceous glands, nor are they associated with sebum -- which is greasy  Seborrheic keratosis may also be called "SK," "Seb K," "basal cell papilloma," "senile wart," or "barnacle "      Researchers have noted:   Eruptive seborrhoeic keratoses can follow sunburn or dermatitis   Skin friction may be the reason they appear in body folds   Viral cause (e g , human papillomavirus) seems unlikely   Stable and clonal mutations or activation of FRFR3, PIK3CA, PAZ, AKT1 and EGFR genes are found in seborrhoeic keratoses   Seborrhoeic keratosis can arise from solar lentigo   FRFR3 mutations also arise in solar lentigines  These mutations are associated with increased age and location on the head and neck, suggesting a role of ultraviolet radiation in these lesions   Seborrheic keratoses do not harbour tumour suppressor gene mutations   Epidermal growth factor receptor inhibitors, which are used to treat some cancers, often result in an increase in verrucal (warty) keratoses  There is no easy way to remove multiple lesions on a single occasion  Unless a specific lesion is "inflamed" and is causing pain or stinging/burning or is bleeding, most insurance companies do not authorize treatment

## 2022-01-20 NOTE — PROGRESS NOTES
[facility] Follow Up Note    Patient Name: Sherry Brooke  Encounter Date: 1/20/22    Today's Chief Concerns:  April Chambers Concern #1:  Follow up atopic dermatitis       Current Medications:    Current Outpatient Medications:     albuterol (2 5 mg/3 mL) 0 083 % nebulizer solution, Inhale 2 5 mg every 4 (four) hours as needed, Disp: , Rfl:     albuterol (VENTOLIN HFA) 90 mcg/act inhaler, Inhale 2 puffs every 6 (six) hours as needed, Disp: , Rfl:     amLODIPine (NORVASC) 2 5 mg tablet, Take 2 5 mg by mouth 2 (two) times a day , Disp: , Rfl:     amoxicillin (AMOXIL) 500 mg capsule, TAKE 4 CAPSULES BY MOUTH ONE HOUR PRIOR TO APPOINTMENT (Patient not taking: Reported on 9/2/2021), Disp: , Rfl:     aspirin 81 MG tablet, Take 81 mg by mouth daily in the early morning Last dose 6/12/20, Disp: , Rfl:     benzonatate (TESSALON) 200 MG capsule, Take 1 capsule (200 mg total) by mouth 3 (three) times a day (Patient not taking: Reported on 4/19/2021), Disp: 60 capsule, Rfl: 1    bimatoprost (LUMIGAN) 0 01 % ophthalmic drops, Administer 1 drop into the left eye daily at bedtime, Disp: , Rfl:     Brinzolamide-Brimonidine (SIMBRINZA) 1-0 2 % SUSP, Apply to eye 2 (two) times a day Left eye only, Disp: , Rfl:     Clobetasol Prop Emollient Base 0 05 % emollient cream, Apply topically 2 (two) times a day, Disp: 30 g, Rfl: 0    dupilumab (Dupixent) subcutaneous injection, Inject 2 mL (300 mg total) under the skin every 14 (fourteen) days, Disp: 4 mL, Rfl: 13    furosemide (LASIX) 20 mg tablet, Take 20 mg by mouth daily, Disp: , Rfl:     GLUCOSAMINE HCL PO, Take 1 tablet by mouth daily in the early morning , Disp: , Rfl:     hydrochlorothiazide (HYDRODIURIL) 25 mg tablet, Take 25 mg by mouth daily, Disp: , Rfl:     ipratropium (ATROVENT) 0 02 % nebulizer solution, INHALE THE CONTENTS OF 1 VIAL VIA NEBULIZER THREE TIMES DAILY, Disp: , Rfl:     Multiple Vitamins-Minerals (PRESERVISION AREDS 2 PO), Take by mouth 2 (two) times a day, Disp: , Rfl:     Netarsudil Dimesylate (Rhopressa) 0 02 % SOLN, Apply to eye, Disp: , Rfl:     omeprazole (PriLOSEC) 40 MG capsule, Take 1 capsule (40 mg total) by mouth daily, Disp: 30 capsule, Rfl: 0    potassium chloride (K-DUR,KLOR-CON) 20 mEq tablet, Take 20 mEq by mouth daily, Disp: , Rfl:     predniSONE 5 mg tablet, Two tablets daily (Patient not taking: Reported on 6/11/2021), Disp: 60 tablet, Rfl: 1    rosuvastatin (CRESTOR) 20 MG tablet, Take 20 mg by mouth daily at bedtime , Disp: , Rfl:     triamcinolone (KENALOG) 0 1 % cream, Apply topically to affected areas twice a day (Patient not taking: Reported on 6/11/2021), Disp: 453 6 g, Rfl: 0    triamcinolone (KENALOG) 0 1 % ointment, Apply topically to the affected areas 2x/day  Do not apply on face and groin , Disp: 454 g, Rfl: 1    CONSTITUTIONAL:   There were no vitals filed for this visit  Specific Alerts:    Have you been seen by a [facility]ologist in the last 3 years? YES        Allergies   Allergen Reactions    Bcg Live Other (See Comments)     Shakes, became very ill    Timolol Shortness Of Breath    Mitomycin Rash     Chronic rash which lasted for months    Xalatan [Latanoprost]      Back ache-severe    Brinzolamide-Brimonidine GI Intolerance    Carboxymethylcellulose     Dorzolamide GI Intolerance    Lipitor [Atorvastatin] GI Intolerance     Stomach pain    Other Rash     Cat Scan Dye       May we call your Preferred Phone number to discuss your specific medical information? YES    May we leave a detailed message that includes your specific medical information? YES    Have you traveled outside of the [facility] in the past 3 months? No    Do you currently have a pacemaker or defibrillator? YES    Do you have any artificial heart valves, joints, plates, screws, rods, stents, pins, etc? No   - If Yes, were any placed within the last 2 years?     Do you require any medications prior to a surgical procedure? No   - If Yes, for which procedure? - If Yes, what medications to you require? Are you taking any medications that cause you to bleed more easily ("blood thinners") No    Have you ever experienced a rapid heartbeat with epinephrine? No        Review of Systems:  Have you recently had or currently have any of the following? · Fever or chills: No  · Night Sweats: No  · Headaches: No  · Weight Gain: No  · Weight Loss: No  · Blurry Vision: No  · Nausea: No  · Vomiting: No  · Diarrhea: No  · Blood in Stool: No  · Abdominal Pain: No  · Itchy Skin: No  · Painful Joints: No  · Swollen Joints: No  · Muscle Pain: No  · Irregular Mole: No  · Sun Burn: No  · Dry Skin: No  · Skin Color Changes: No  · Scar or Keloid: No  · Cold Sores/Fever Blisters: No  · Bacterial Infections/MRSA: No  · Anxiety: No  · Depression: No  · Suicidal or Homicidal Thoughts: No      PSYCH: Normal mood and affect  EYES: Normal conjunctiva  ENT: Normal lips and oral mucosa  CARDIOVASCULAR: No edema  RESPIRATORY: Normal respirations  HEME/LYMPH/IMMUNO:  No regional lymphadenopathy except as noted below in ASSESSMENT AND PLAN BY DIAGNOSIS    FULL ORGAN SYSTEM SKIN EXAM (SKIN)   Hair, Scalp, Ears, Face Normal except as noted below in Assessment   Neck, Cervical Chain Nodes Normal except as noted below in Assessment   Right Arm/Hand/Fingers Normal except as noted below in Assessment   Left Arm/Hand/Fingers Normal except as noted below in Assessment   Chest/Breasts/Axillae Viewed areas Normal except as noted below in Assessment   Abdomen, Umbilicus Normal except as noted below in Assessment   Back/Spine Normal except as noted below in Assessment       ATOPIC DERMATITIS ("childhood Eczema")    Physical Exam:   Anatomic Location Affected:  Trunk and extremities   Morphological Description:  No apparent rash   Severity: clear   Pertinent Positives:   Pertinent Negatives:     Additional History of Present Condition:  Present with wife and states that he is clear  Assessment and Plan:  Based on a thorough discussion of this condition and the management approach to it (including a comprehensive discussion of the known risks, side effects and potential benefits of treatment), the patient (family) agrees to implement the following specific plan:     Stop dupixent at this time - HOLD last 2 injections and if rash flares up can restart and contact the office  Assessment and Plan:   Atopic Dermatitis is a chronic, itchy skin condition that is very common in children but may occur at any age  It is also known as eczema or atopic eczema   It is the most common form of dermatitis  Atopic dermatitis usually occurs in people who have an atopic tendency    This means they may develop any or all of these closely linked conditions: Atopic dermatitis, asthma, hay fever (allergic rhinitis), eosinophilic esophagitis, and gastroenteritis  Often these conditions run within families with a parent, child or sibling also affected  A family history of asthma, eczema or hay fever is particularly useful in diagnosing atopic dermatitis in infants  Atopic dermatitis arises because of a complex interaction of genetic and environmental factors  These include defects in skin barrier function making the skin more susceptible to irritation by soap and other contact irritants, the weather, temperature and non-specific triggers  There is also an element of immune system dysregulation that is often present  By definition, it is chronic and has a "waxing-waning" nature; flares should be expected but with good education and treatment strategies can be minimized  Some specific tips we discussed:   Dry skin care   Usingonly mild cleansers (hypoallergenic and without fragrances) and fragrance free detergent (not unscented products which contain a masking agent); we discussed avoiding irritants/fragranced products     The importance of regular application of moisturizers daily (at least 3 times a day)   The known and theoretical side effects of steroids at length, including but not limited to atrophy of skin and increased pressure in eye (glaucoma) and clouding of the eye's lens (cataracts) if used in or around the eye for extended durations   The specific over-the-counter interventions and medications   Side effects, risks and benefits of topical and oral medications discussed   After lengthy discussion of etiology and treatment options, we decided to implement the following personalized treatment plan:      YOUR PERSONALIZED ECZEMA ACTION PLAN    FOR ACUTE FLARING    1) Antimicrobials  a) None    b) Clindamycin 75mg/5mL solution:  Take by mouth THREE TIMES A DAY for 10 days straight to help reduce the amount of presumed Staph aureus colonizing the skin  c) Bleach baths:  Soak in a bleach bath (recipe provided via email) about 3 times a week for about 5-10 minutes a day; crucially, make sure to rinse thoroughly with fresh water and apply moisturizer within 3 minutes of toweling off gently  2) Anti-Inflammatories  a) During periods of acute flaring, apply the Hydrocortisone 2 5% ointment to the face and neck TWICE A DAY for 2 weeks straight  To give you an idea of how much medication to use: You should be using at least a single full 30-gram tube of this medication EACH WEEK  b) During periods of acute flaring, apply the Triamcinolone 0 1% ointment to the body TWICE A DAY for 2 weeks straight  To give you an idea of how much medication to use: You should be using at least two full 30-gram tubes of this medication EACH WEEK  Do NOT apply this stronger medication to the face or groin area as the skin is too thin and at greater risk for side effects  3) Moisturizers  a) Apply Eucerin cream or Aquaphor ointment at least 3 times a day    It is best to use moisturizers and prescription medications at DIFFERENT times during the day (ideally, about 30 minutes apart)  If you must apply your prescription medication and your moisturizer at the same time, then ALWAYS apply the prescription medication FIRST (i e , directly to the skin); as we discussed, this allows the prescription medication to reach the skin without being blocked by the thick moisturizer! 4) Oral Antihistamines  a) Cetirizine (Zyrtec): Take 5 mL (1 teaspoon) of 5mg/5mL oral suspension EACH MORNING through allergy season  b) Hydroxyzine (Atarax): Take 5 mL (1 teaspoon) of 12 5mg/5mL oral solution about 1 hour before bedtime for the next 3-5 evenings to help reduce scratching  FOR CHRONIC MAINTENANCE    1) Antimicrobials  a) None    b) Bleach baths:  Soak in a bleach bath (recipe provided via email) about 3 times a week for about 5-10 minutes a day; crucially, make sure to rinse thoroughly with fresh water and apply moisturizer within 3 minutes of toweling off gently  2) Anti-Inflammatories  a) Once in the chronic maintenance phase apply Hydrocortisone 2 5% ointment to the face ONCE A DAY and only on Mondays, Wednesdays and Fridays to help decrease the inflammation; try to decrease to [facility] and Fridays if possible  b) Once in the chronic maintenance phase apply Triamcinolone 0 1% ointment to the body ONCE A DAY and only on Mondays, Wednesdays and Fridays to help decrease the inflammation; try to decrease to [facility] and Fridays if possible  Do NOT apply this stronger medication to your face or groin area as the skin is too thin and at greater risk for side effects  c) Apply crisaborole 2% [employer]) ointment TWICE A DAY on Tuesdays, Thursdays, Saturdays and Sundays (i e , the days you are not applying a topical steroid) to both the face/neck and body    As we discussed, this product is approved for the topical treatment of mild-to-moderate eczema in patients 3years of age and older; use of the medication in kids younger than 2 is considered off label and has not been formally studied  Burning and stinging are the most commonly reported side effects of this medication  Rarely, this product has been known to cause hives and hypersensitivity reactions; discontinue its use if you develop severe itching, swelling, or redness in the area of application  3) Moisturizers  a) Continue to apply Eucerin cream or Aquaphor ointment at least 3 times a day  It is best to use moisturizers and prescription medications at DIFFERENT times during the day (ideally, about 30 minutes apart)  If you must apply your prescription medication and your moisturizer at the same time, then ALWAYS apply the prescription medication FIRST (i e , directly to the skin); as we discussed, this allows the prescription medication to reach the skin without being blocked by the thick moisturizer! 4) Oral Antihistamines  Take Cetirizine (Zyrtec): Take 5 mL (1 teaspoon) of 5mg/5mL oral suspension through allergy season  EDUCATION AS INTERVENTION! WHAT IS ATOPIC DERMATITIS? Atopic dermatitis (also called eczema) is a condition of the skin where the skin is dry, red, and itchy  The main function of the skin is to provide a barrier from the environment and is also the first defense of the immune system  In atopic dermatitis the skin barrier is decreased or disrupted, and the skin is easily irritated  As a result, moisture escapes the skin more easily, and environmental allergens and microbes can enter the skin more easily  Consequently, the skin's immune system is altered  If there are increased allergic type cells in the skin, the skin may become red and hyper-excitable   This leads to itching and a subsequent rash  WHY DO PEOPLE GET ATOPIC DERMATITIS? There is no single answer because many factors are involved  It is likely a combination of genetic makeup and environmental triggers and/or exposures   Excessive drying or sweating of the skin, Irritating soaps, dust mites, and pet dander are some of the more common triggers  There is no blood test that can be done to confirm this diagnosis  The history and appearance of the skin is usually sufficient for a diagnosis  However, in some cases if the rash does not fit with the history or respond appropriately to treatment, a skin biopsy may be helpful  Many children do outgrow atopic dermatitis or get better; however, many continue to have sensitive skin into adulthood  Asthma and hay fever are often seen in many patients with atopic dermatitis; however, asthma flares do not necessarily occur at the same time as skin flares  PREVENTING FLARES OF ATOPIC DERMATITIS  The first step is to maintain the skin's barrier function  Keep the skin well moisturized  Avoid irritants and triggers  Use prescribed medicine when there are red or rough areas to help the skin to return to normal as quickly as possible  Try to limit scratching  If you keep the skin well moisturized, and avoid coming in contact with things you know irritate your child's skin, there will be less flares  However, some flares of atopic dermatitis are beyond your control  You should work with your health care provider to come up with a plan that minimizes flares while minimizing long term use of medications that suppress the immune system  WHAT ARE SOME OF THE TRIGGERS? Triggers are different for different people  The most common triggers are:   Heat and sweat for some individuals, cold weather for others   House dust mites, pet fur   Wool; synthetic fabrics like nylon; dyed fabrics   Tobacco smoke    Fragrances in: shampoos, soaps, lotions, laundry detergents, fabric softeners   Saliva or prolonged exposure to water  WHAT ABOUT FOOD ALLERGIES? This is a very controversial topic, as many believe that food allergies are responsible for skin flares   In some cases, specific foods may cause worsening of atopic dermatitis; however this occurs in a minority of cases and usually happens within a few hours of ingestion  While food allergy is more common in children with eczema, foods are specific triggers for flares in only a small percentage of children  If you notice that the skin flares after certain foods you can see if eliminating one food at time makes a difference, as long as your child can still enjoy a well-balanced diet  There are blood (RAST) and skin (PRICK) tests that can check for allergies, but they are often positive in children who are not truly allergic  Therefore it is important that you work with your allergist and dermatologist to determine which foods are relevant and causing true symptoms  Extreme food elimination diets without the guidance of your doctor, which have become more popular in recent years, may even result in worsening of the skin rash due to malnutrition and avoidance of essential nutrients  TREATMENT  Treatments are aimed at minimizing exposure to irritating factors and decreasing  the skin inflammation which results in an itchy rash  There are many different treatment options, which depend on your child's rash, its location, and severity  Topical treatments include corticosteroids and steroid-like creams such as Protopic, Elidel, and Eucrisa, which are believed to not thin the skin  Please read the discussions below regarding risks and benefits of all of these creams  Occasionally bacterial or viral infections can occur which flare the skin and require oral and/or topical antibiotics or antivirals  In some cases bleach baths 2-3 times weekly can be helpful to prevent recurrent infection  For severe disease, strong oral medications such as corticosteroids, methotrexate or azathioprine (Imuran) may be needed  These medications require close monitoring and follow-up   You should discuss the risks/ benefits/alternatives of these medications with your health care provider to come up with the best treatment plan for your child  1) Use moisturizer all over the entire body at least THREE TIMES a day  This keeps the skin moisturized to restore the barrier function  Find a cream or ointment that your child likes - this is the most important  The medicines do not work in the bottle  The thicker the moisturizer, generally the better barrier it provides  Ointments often moisturize better than creams; and creams work better than lotions  Lotions are more useful during the summer when thick greasy ointments are uncomfortable  If you put moisturizer on the skin after bathing, while the skin is damp, it is twice as effective  The moisturizer provides a seal holding the water in the skin  You may bathe your child in warm - not hot - water, for short periods of time (no more than 5-10 minutes at a time) once a day if they like  Lightly pat your child dry with a towel and, while the skin is still damp, (within 3 minutes) apply a moisturizer from head to toe  If your child is using a medicated cream, apply it and allow it to absorb completely BEFORE you apply the moisturizer  2) Apply the prescription medication TWICE A DAY to only the red, rough areas on the skin OR AS [place]  Put the medication on your fingers and gently rub it into the areas  Usually the medicine will help an area within a few days time  Try to put the medicine on for two days after you have noticed that the redness is no longer present; this will help the redness from returning  The severity of the rash and the strength and usage of the medication will determine how quickly you see improvement  It is important that you do not overuse steroid creams, and if you notice a thin, shiny appearance to the skin or broken blood vessels, you should stop using the cream and consult your health care provider regarding possible overuse/overthinning of the skin    The face, armpits and groin have particularly thin and sensitive skin and are therefore most at risk for bad results if steroids are over-used in these sites  3) Avoid triggers  Some children have specific things that trigger itching and rashes, while others may have none that can be identified  It may require a little bit of trial and error to see what applies to your child  Also, triggers can change over time for your child  The most common triggers are listed above; start with these  Avoid the use of fabric softeners in the washing machine or dryer sheets (unless they are fragrance-free)  Try to use laundry detergents, soaps and shampoos that are fragrance-free  You may find it helpful to double-rinse your clothes  Some children are sensitive to house dust mites and they may benefit from a plastic mattress wrap  While food allergy is more common in children with eczema, foods are specific triggers for flares in only a small percentage of children  If you notice that the skin flares after certain foods you can see if eliminating one food at time makes a difference, as long as your child can still enjoy a well-balanced diet  4) Consider using a medication like an anti-histamine by mouth to help control the itching  Scratching only makes the skin more reactive and the barrier function even more disrupted  It can cause both children and their parents to lose sleep! There are different types of anti-itch medications  Some cause more drowsiness than others  Both types are acceptable depending on your child and your preference  Start with Benadryl and if that does not work, ask for a prescription antihistamine      5) About the prescription creams:  Corticosteroid creams and ointments (generally things with "-one" or "tita" on the end of their names): The strength of the cream or ointment depends on the name of the active ingredient  The numbers at the end do not indicate the relative strength    Thus triamcinolone 0 1% ointment, considered a mid-strength corticosteroid, is much stronger than hydrocortisone 1% even though the number following the name is much lower  Topical corticosteroids are very effective in treating atopic dermatitis  When used in the manner prescribed (to rashy areas of skin and for no more than a few weeks at a time to any one area) they are very safe  These are corticosteroids and are anti-inflammatory, not the anabolic steroids like those used illegally by some athletes  Topical non-steroid creams and ointments (immunomodulators): These creams and ointments are also called topical calcineurin inhibitors (TCIs)  These include Protopic ointment and Elidel cream  Crisaborole 2% [employer] Copping) is a prescription ointment that targets an enzyme called PDE4 (phosphodiesterase 4)  It is used on the skin topically to treat mild-to-moderate eczema in adults and children 3years of age and older  In total, these nonsteroidal prescriptions are used to help decrease itching and redness in the skin  They are not as strong as most steroid creams; however, it is believed that they do not thin the skin when overused  They are generally used as second-line medications, though they may be used alone or in conjunction with topical steroids  In sensitive areas such as the face, underarms or groin, they are often recommended  They can sting inflamed skin, but are generally well tolerated once the skin is healing  The FDA placed a black-box warning on both Elidel and Protopic in 2006 based on animal studies using the medications  Some animals developed skin cancer and lymphoma  Subsequently, the FDA released a statement that there is no causal relationship between the two medications and cancer  Because of this concern, there are ongoing studies to evaluate this relationship in humans  So far, there are studies that support the safety of these medications    One showed that the rates of cancer in patient using these medications topically were less than the rates of the general population and another showed that in patient's using the medication over a large area of the body, the levels of the medication in the blood was undetectable  As for Eucrisa, this product is only approved for the topical treatment of mild-to-moderate eczema in patients 3years of age and older; use of the medication in kids younger than 2 is considered off label and has not been formally studied  Burning and stinging are the most commonly reported side effects of this medication  Rarely, this product has been known to cause hives and hypersensitivity reactions; discontinue its use if you develop severe itching, swelling, or redness in the area of application  SEBORRHEIC KERATOSIS; NON-INFLAMED    Physical Exam:   Anatomic Location Affected:  trunk   Morphological Description:  Flat and raised, waxy, smooth to warty textured, yellow to brownish-grey to dark brown to blackish, discrete, "stuck-on" appearing papules   Pertinent Positives:   Pertinent Negatives: Additional History of Present Condition:  Patient reports new bumps on the skin  Denies itch, burn, pain, bleeding or ulceration  Present constantly; nothing seems to make it worse or better  No prior treatment  Assessment and Plan:  Based on a thorough discussion of this condition and the management approach to it (including a comprehensive discussion of the known risks, side effects and potential benefits of treatment), the patient (family) agrees to implement the following specific plan:     Reassured benign    Seborrheic Keratosis  A seborrheic keratosis is a harmless warty spot that appears during adult life as a common sign of skin aging  Seborrheic keratoses can arise on any area of skin, covered or uncovered, with the usual exception of the palms and soles  They do not arise from mucous membranes  Seborrheic keratoses can have highly variable appearance        Seborrheic keratoses are extremely common  It has been estimated that over 90% of adults over the age of 61 years have one or more of them  They occur in males and females of all races, typically beginning to erupt in the 35s or 45s  They are uncommon under the age of 21 years  The precise cause of seborrhoeic keratoses is not known  Seborrhoeic keratoses are considered degenerative in nature  As time goes by, seborrheic keratoses tend to become more numerous  Some people inherit a tendency to develop a very large number of them; some people may have hundreds of them  The name "seborrheic keratosis" is misleading, because these lesions are not limited to a seborrhoeic distribution (scalp, mid-face, chest, upper back), nor are they formed from sebaceous glands, nor are they associated with sebum -- which is greasy  Seborrheic keratosis may also be called "SK," "Seb K," "basal cell papilloma," "senile wart," or "barnacle "      Researchers have noted:   Eruptive seborrhoeic keratoses can follow sunburn or dermatitis   Skin friction may be the reason they appear in body folds   Viral cause (e g , human papillomavirus) seems unlikely   Stable and clonal mutations or activation of FRFR3, PIK3CA, PAZ, AKT1 and EGFR genes are found in seborrhoeic keratoses   Seborrhoeic keratosis can arise from solar lentigo   FRFR3 mutations also arise in solar lentigines  These mutations are associated with increased age and location on the head and neck, suggesting a role of ultraviolet radiation in these lesions   Seborrheic keratoses do not harbour tumour suppressor gene mutations   Epidermal growth factor receptor inhibitors, which are used to treat some cancers, often result in an increase in verrucal (warty) keratoses  There is no easy way to remove multiple lesions on a single occasion    Unless a specific lesion is "inflamed" and is causing pain or stinging/burning or is bleeding, most insurance companies do not authorize treatment      Scribe Attestation    I,:  Deana Irwin MA am acting as a scribe while in the presence of the attending physician :       I,:  Rosangela Benedict MD personally performed the services described in this documentation    as scribed in my presence :

## 2022-03-17 ENCOUNTER — TELEPHONE (OUTPATIENT)
Dept: DERMATOLOGY | Facility: CLINIC | Age: 87
End: 2022-03-17

## 2022-03-17 NOTE — TELEPHONE ENCOUNTER
Dr Navarro Laughter is out on vacation this week  I will call the patient and let them know, and see if I can help with any questions that they have

## 2022-03-17 NOTE — TELEPHONE ENCOUNTER
Pt's wife is calling, would like to speak to Dr Deepthi Parra and have a few questions about his [address], thank you   [phone]

## 2022-03-21 ENCOUNTER — TELEPHONE (OUTPATIENT)
Dept: DERMATOLOGY | Facility: CLINIC | Age: 87
End: 2022-03-21

## 2022-03-21 NOTE — TELEPHONE ENCOUNTER
Yes we can get new prior authorization started  He has a history of atopic eczema with    secondary erythroderma  At his last visit we had tapered off treatment and eczema has flared  We will need to appeal to restart dupixent  Note that prior to dupixent patient had failed  Topical steroids including triamcinolone, clobetasol, and tacrolimus  He has failed tacrolimus (prograf) orally as well     Thank you

## 2022-03-21 NOTE — TELEPHONE ENCOUNTER
Patients wife called and I gave her a call back  Patients wife wanted to know if her  can start back up on the [address]  Her  started to get a rash again  I saw in the chart that Dr Marga Rendon said it was okay to start back up if rash started again  Patients insurance changed and she stated that this is the number to call for a new PA to be done  The patient stated this number to call is to get the [address] approved through on phone/person vs doing paper work  Insurance recommended  [phone] - [facility]  Patient stated she called the insurance company and that was the info they gave her

## 2022-03-23 ENCOUNTER — TELEPHONE (OUTPATIENT)
Dept: DERMATOLOGY | Facility: CLINIC | Age: 87
End: 2022-03-23

## 2022-03-23 NOTE — TELEPHONE ENCOUNTER
----- Message from [facility] sent at 3/23/2022  3:40 PM EDT -----  Regarding: Dupixent/ Rash  Patients wife called about rash getting worse and is worried  Dupixent was injected on Monday, 3 days ago   Phone number: [phone]

## 2022-03-24 DIAGNOSIS — R21 RASH: ICD-10-CM

## 2022-03-24 NOTE — TELEPHONE ENCOUNTER
Pt's wife called stating that pt needs refill of triamcinolone ointment  He would like it sent to [address] [place] [address]

## 2022-03-25 DIAGNOSIS — R21 RASH: ICD-10-CM

## 2022-03-25 DIAGNOSIS — L20.81 ATOPIC NEURODERMATITIS: Primary | ICD-10-CM

## 2022-03-25 DIAGNOSIS — L20.89 OTHER ATOPIC DERMATITIS: ICD-10-CM

## 2022-03-25 NOTE — TELEPHONE ENCOUNTER
Dupixent is approved, back dating from February 23, 2022 to March 25, 2023  Patients wife was called and informed as well

## 2022-03-28 DIAGNOSIS — L20.9 ATOPIC DERMATITIS, UNSPECIFIED TYPE: ICD-10-CM

## 2022-03-28 RX ORDER — DUPILUMAB 300 MG/2ML
300 INJECTION, SOLUTION SUBCUTANEOUS
Qty: 4 ML | Refills: 13 | Status: SHIPPED | OUTPATIENT
Start: 2022-03-28

## 2022-03-31 ENCOUNTER — TELEPHONE (OUTPATIENT)
Dept: DERMATOLOGY | Age: 87
End: 2022-03-31

## 2022-03-31 NOTE — TELEPHONE ENCOUNTER
Called and spoke to the pharmacy today about a 90 day prescription for [facility]  His wife and I have been talking the last few days with getting the [address] covered  Insurance approved the biologic until March of 2023  Patients wife stated insurance approves 90 days supply at a time and wanted to have the full amount sent to her house  Patients wife had given me the pharmacy number [facility] to renew the prescription to 90 days instead of what it was originally signed for  Everything is good to go on our end in the office  Patients wife is aware that the biologic will be sent in the mail either today or tomorrow

## 2022-03-31 NOTE — TELEPHONE ENCOUNTER
[facility],   P:  [phone] Option 1 and 6 goes to pharmacy  F:  [phone]     Pt requesting 90 day supply, they need to verify dose form  Pls cb to verify  Message:  Hi, my name is Yocasta Cruz  I'm a pharmacy tech here at [facility]  Just calling on a monitored and recorded line  I am calling for patient  First name is Rajiv Reed  Last name is Dieter  Date of birth is [birth year]  Thirty patient is requesting a 90 day supply for the Dupixent and we are also needing to verify the dose form our callback number is [phone]  Option One and option six will go to the pharmacist  Our fax is to [phone]  Thank you

## 2022-04-04 ENCOUNTER — TELEPHONE (OUTPATIENT)
Dept: DERMATOLOGY | Facility: CLINIC | Age: 87
End: 2022-04-04

## 2022-04-04 NOTE — TELEPHONE ENCOUNTER
I discussed that there is no contraindication for use of pophylactic steroisd with dupexnet    However verification of dose recommeded,

## 2022-04-18 ENCOUNTER — FOLLOW UP (OUTPATIENT)
Dept: URBAN - METROPOLITAN AREA CLINIC 6 | Facility: CLINIC | Age: 87
End: 2022-04-18

## 2022-04-18 DIAGNOSIS — H35.3131: ICD-10-CM

## 2022-04-18 DIAGNOSIS — H44.521: ICD-10-CM

## 2022-04-18 DIAGNOSIS — H18.513: ICD-10-CM

## 2022-04-18 DIAGNOSIS — H40.1122: ICD-10-CM

## 2022-04-18 PROCEDURE — 92014 COMPRE OPH EXAM EST PT 1/>: CPT

## 2022-04-18 ASSESSMENT — VISUAL ACUITY: OS_CC: 20/80

## 2022-04-18 ASSESSMENT — TONOMETRY: OS_IOP_MMHG: 13

## 2022-10-03 ENCOUNTER — FOLLOW UP (OUTPATIENT)
Dept: URBAN - METROPOLITAN AREA CLINIC 6 | Facility: CLINIC | Age: 87
End: 2022-10-03

## 2022-10-03 DIAGNOSIS — H18.512: ICD-10-CM

## 2022-10-03 DIAGNOSIS — H35.3121: ICD-10-CM

## 2022-10-03 DIAGNOSIS — H44.521: ICD-10-CM

## 2022-10-03 DIAGNOSIS — H40.1122: ICD-10-CM

## 2022-10-03 DIAGNOSIS — Z96.1: ICD-10-CM

## 2022-10-03 PROCEDURE — 92083 EXTENDED VISUAL FIELD XM: CPT

## 2022-10-03 PROCEDURE — 92133 CPTRZD OPH DX IMG PST SGM ON: CPT

## 2022-10-03 PROCEDURE — 92014 COMPRE OPH EXAM EST PT 1/>: CPT

## 2022-10-03 PROCEDURE — 92202 OPSCPY EXTND ON/MAC DRAW: CPT

## 2022-10-03 ASSESSMENT — TONOMETRY: OS_IOP_MMHG: 17

## 2022-10-03 ASSESSMENT — VISUAL ACUITY: OS_CC: 20/50

## 2022-12-02 ENCOUNTER — TELEPHONE (OUTPATIENT)
Dept: DERMATOLOGY | Facility: CLINIC | Age: 87
End: 2022-12-02

## 2022-12-02 NOTE — TELEPHONE ENCOUNTER
Patient called to schedule appt with Dr John Samuels, however she thought provider was still in [place]  Advised he only sees patients in [facility] or [facility]  Pt would like provider to call regarding Dupixent

## 2022-12-02 NOTE — TELEPHONE ENCOUNTER
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

## 2022-12-02 NOTE — TELEPHONE ENCOUNTER
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

## 2023-01-17 ENCOUNTER — OFFICE VISIT (OUTPATIENT)
Dept: DERMATOLOGY | Facility: CLINIC | Age: 88
End: 2023-01-17

## 2023-01-17 VITALS — HEIGHT: 66 IN | BODY MASS INDEX: 30.7 KG/M2 | TEMPERATURE: 97.8 F | WEIGHT: 191 LBS

## 2023-01-17 DIAGNOSIS — Z85.89 HISTORY OF SQUAMOUS CELL CARCINOMA: ICD-10-CM

## 2023-01-17 DIAGNOSIS — L82.1 SEBORRHEIC KERATOSIS: ICD-10-CM

## 2023-01-17 DIAGNOSIS — L20.9 ATOPIC DERMATITIS, UNSPECIFIED TYPE: Primary | ICD-10-CM

## 2023-01-17 DIAGNOSIS — L57.0 KERATOSIS, ACTINIC: ICD-10-CM

## 2023-01-17 RX ORDER — MELATONIN
1000 DAILY
COMMUNITY

## 2023-01-17 RX ORDER — DUPILUMAB 300 MG/2ML
300 INJECTION, SOLUTION SUBCUTANEOUS
Qty: 4 ML | Refills: 13 | Status: SHIPPED | OUTPATIENT
Start: 2023-01-17

## 2023-01-17 NOTE — Clinical Note
Farhat Cole,  Please place this patient on your radar  Their dupixent prior authorization expires March 25, 2023  We went ahead and renewed the prescription today     Thanks, RENÉ Home	[place]

## 2023-01-17 NOTE — PATIENT INSTRUCTIONS
ATOPIC DERMATITIS     Physical Exam:  Anatomic Location Affected:  Trunk and extremities    Assessment and Plan:  Based on a thorough discussion of this condition and the management approach to it (including a comprehensive discussion of the known risks, side effects and potential benefits of treatment), the patient (family) agrees to implement the following specific plan:  Continue Dupixent      Assessment and Plan:   Atopic Dermatitis is a chronic, itchy skin condition that is very common in children but may occur at any age  It is also known as “eczema” or “atopic eczema ” It is the most common form of dermatitis  Atopic dermatitis usually occurs in people who have an “atopic tendency ”  This means they may develop any or all of these closely linked conditions: Atopic dermatitis, asthma, hay fever (allergic rhinitis), eosinophilic esophagitis, and gastroenteritis  Often these conditions run within families with a parent, child or sibling also affected  A family history of asthma, eczema or hay fever is particularly useful in diagnosing atopic dermatitis in infants  Atopic dermatitis arises because of a complex interaction of genetic and environmental factors  These include defects in skin barrier function making the skin more susceptible to irritation by soap and other contact irritants, the weather, temperature and non-specific triggers  There is also an element of immune system dysregulation that is often present  By definition, it is chronic and has a "waxing-waning" nature; flares should be expected but with good education and treatment strategies can be minimized  Some specific tips we discussed:  Dry skin care  Using only mild cleansers (hypoallergenic and without fragrances) and fragrance free detergent (not “unscented” products which contain a masking agent); we discussed avoiding irritants/fragranced products    The importance of regular application of moisturizers daily (at least 3 times a day)  The known and theoretical side effects of steroids at length, including but not limited to atrophy of skin and increased pressure in eye (glaucoma) and clouding of the eye's lens (cataracts) if used in or around the eye for extended durations  The specific over-the-counter interventions and medications  Side effects, risks and benefits of topical and oral medications discussed  After lengthy discussion of etiology and treatment options, we decided to implement the following personalized treatment plan:      EDUCATION AS INTERVENTION! WHAT IS ATOPIC DERMATITIS? Atopic dermatitis (also called “eczema”) is a condition of the skin where the skin is dry, red, and itchy  The main function of the skin is to provide a barrier from the environment and is also the first defense of the immune system  In atopic dermatitis the skin barrier is decreased or disrupted, and the skin is easily irritated  As a result, moisture escapes the skin more easily, and environmental allergens and microbes can enter the skin more easily  Consequently, the skin's immune system is altered  If there are increased allergic type cells in the skin, the skin may become red and “hyper-excitable ” This leads to itching and a subsequent rash  WHY DO PEOPLE GET ATOPIC DERMATITIS? There is no single answer because many factors are involved  It is likely a combination of genetic makeup and environmental triggers and/or exposures  Excessive drying or sweating of the skin, Irritating soaps, dust mites, and pet dander are some of the more common triggers  There is no blood test that can be done to confirm this diagnosis  The history and appearance of the skin is usually sufficient for a diagnosis  However, in some cases if the rash does not fit with the history or respond appropriately to treatment, a skin biopsy may be helpful    Many children do outgrow atopic dermatitis or get better; however, many continue to have sensitive skin into adulthood  Asthma and hay fever are often seen in many patients with atopic dermatitis; however, asthma flares do not necessarily occur at the same time as skin flares  PREVENTING FLARES OF ATOPIC DERMATITIS  The first step is to maintain the skin's barrier function  Keep the skin well moisturized  Avoid irritants and triggers  Use prescribed medicine when there are red or rough areas to help the skin to return to normal as quickly as possible  Try to limit scratching  If you keep the skin well moisturized, and avoid coming in contact with things you know irritate your child's skin, there will be less flares  However, some flares of atopic dermatitis are beyond your control  You should work with your health care provider to come up with a plan that minimizes flares while minimizing long term use of medications that suppress the immune system  WHAT ARE SOME OF THE TRIGGERS? Triggers are different for different people  The most common triggers are:  Heat and sweat for some individuals, cold weather for others  House dust mites, pet fur  Wool; synthetic fabrics like nylon; dyed fabrics  Tobacco smoke   Fragrances in: shampoos, soaps, lotions, laundry detergents, fabric softeners  Saliva or prolonged exposure to water  WHAT ABOUT FOOD ALLERGIES? This is a very controversial topic, as many believe that food allergies are responsible for skin flares  In some cases, specific foods may cause worsening of atopic dermatitis; however this occurs in a minority of cases and usually happens within a few hours of ingestion  While food allergy is more common in children with eczema, foods are specific triggers for flares in only a small percentage of children  If you notice that the skin flares after certain foods you can see if eliminating one food at time makes a difference, as long as your child can still enjoy a well-balanced diet     There are blood (RAST) and skin (PRICK) tests that can check for allergies, but they are often positive in children who are not truly allergic  Therefore it is important that you work with your allergist and dermatologist to determine which foods are relevant and causing true symptoms  Extreme food elimination diets without the guidance of your doctor, which have become more popular in recent years, may even result in worsening of the skin rash due to malnutrition and avoidance of essential nutrients  TREATMENT  Treatments are aimed at minimizing exposure to irritating factors and decreasing  the skin inflammation which results in an itchy rash  There are many different treatment options, which depend on your child's rash, its location, and severity  Topical treatments include corticosteroids and steroid-like creams such as Protopic, Elidel, and Eucrisa, which are believed to not thin the skin  Please read the discussions below regarding risks and benefits of all of these creams  Occasionally bacterial or viral infections can occur which flare the skin and require oral and/or topical antibiotics or antivirals  In some cases bleach baths 2-3 times weekly can be helpful to prevent recurrent infection  For severe disease, strong oral medications such as corticosteroids, methotrexate or azathioprine (Imuran) may be needed  These medications require close monitoring and follow-up  You should discuss the risks/ benefits/alternatives of these medications with your health care provider to come up with the best treatment plan for your child  1) Use moisturizer all over the entire body at least THREE TIMES a day  This keeps the skin moisturized to restore the barrier function  Find a cream or ointment that your child likes - this is the most important  The medicines do not work in the bottle  The thicker the moisturizer, generally the better barrier it provides  Ointments often moisturize better than creams; and creams work better than lotions    Lotions are more useful during the summer when thick greasy ointments are uncomfortable  If you put moisturizer on the skin after bathing, while the skin is damp, it is twice as effective  The moisturizer provides a seal holding the water in the skin  You may bathe your child in warm - not hot - water, for short periods of time (no more than 5-10 minutes at a time) once a day if they like  Lightly pat your child dry with a towel and, while the skin is still damp, (within 3 minutes) apply a moisturizer from head to toe  If your child is using a medicated cream, apply it and allow it to absorb completely BEFORE you apply the moisturizer  2) Apply the prescription medication TWICE A DAY to only the red, rough areas on the skin OR AS [place]  Put the medication on your fingers and gently rub it into the areas  Usually the medicine will help an area within a few days time  Try to put the medicine on for two days after you have noticed that the redness is no longer present; this will help the redness from returning  The severity of the rash and the strength and usage of the medication will determine how quickly you see improvement  It is important that you do not overuse steroid creams, and if you notice a thin, shiny appearance to the skin or broken blood vessels, you should stop using the cream and consult your health care provider regarding possible overuse/overthinning of the skin  The face, armpits and groin have particularly thin and sensitive skin and are therefore most at risk for bad results if steroids are over-used in these sites  3) Avoid triggers  Some children have specific things that trigger itching and rashes, while others may have none that can be identified  It may require a little bit of trial and error to see what applies to your child  Also, triggers can change over time for your child  The most common triggers are listed above; start with these    Avoid the use of fabric softeners in the washing machine or dryer sheets (unless they are fragrance-free)  Try to use laundry detergents, soaps and shampoos that are fragrance-free  You may find it helpful to double-rinse your clothes  Some children are sensitive to house dust mites and they may benefit from a plastic mattress wrap  While food allergy is more common in children with eczema, foods are specific triggers for flares in only a small percentage of children  If you notice that the skin flares after certain foods you can see if eliminating one food at time makes a difference, as long as your child can still enjoy a well-balanced diet  4) Consider using a medication like an anti-histamine by mouth to help control the itching  Scratching only makes the skin more reactive and the barrier function even more disrupted  It can cause both children and their parents to lose sleep! There are different types of anti-itch medications  Some cause more drowsiness than others  Both types are acceptable depending on your child and your preference  Start with Benadryl and if that does not work, ask for a prescription “antihistamine ”    5) About the prescription creams:  Corticosteroid creams and ointments (generally things with "-one" or "tita" on the end of their names): The strength of the cream or ointment depends on the name of the active ingredient  The numbers at the end do not indicate the relative strength  Thus triamcinolone 0 1% ointment, considered a mid-strength corticosteroid, is much stronger than hydrocortisone 1% even though the number following the name is much lower  Topical corticosteroids are very effective in treating atopic dermatitis  When used in the manner prescribed (to rashy areas of skin and for no more than a few weeks at a time to any one area) they are very safe    These are corticosteroids and are anti-inflammatory, not the “anabolic steroids” like those used illegally by some athletes  Topical non-steroid creams and ointments (immunomodulators): These creams and ointments are also called topical calcineurin inhibitors (TCIs)  These include Protopic ointment and Elidel cream  Crisaborole 2% Alline Leggett) is a prescription ointment that targets an enzyme called PDE4 (phosphodiesterase 4)  It is used on the skin topically to treat mild-to-moderate eczema in adults and children 3years of age and older  In total, these nonsteroidal prescriptions are used to help decrease itching and redness in the skin  They are not as strong as most steroid creams; however, it is believed that they do not thin the skin when overused  They are generally used as second-line medications, though they may be used alone or in conjunction with topical steroids  In sensitive areas such as the face, underarms or groin, they are often recommended  They can sting inflamed skin, but are generally well tolerated once the skin is healing  The FDA placed a “black-box” warning on both Elidel and Protopic in 2006 based on animal studies using the medications  Some animals developed skin cancer and lymphoma  Subsequently, the FDA released a statement that there is no causal relationship between the two medications and cancer  Because of this concern, there are ongoing studies to evaluate this relationship in humans  So far, there are studies that support the safety of these medications  One showed that the rates of cancer in patient using these medications topically were less than the rates of the general population and another showed that in patient's using the medication over a large area of the body, the levels of the medication in the blood was undetectable  As for Eucrisa, this product is only approved for the topical treatment of mild-to-moderate eczema in patients 3years of age and older; use of the medication in kids younger than 2 is considered “off label” and has not been formally studied  Burning and stinging are the most commonly reported side effects of this medication  Rarely, this product has been known to cause hives and hypersensitivity reactions; discontinue its use if you develop severe itching, swelling, or redness in the area of application  SEBORRHEIC KERATOSIS; NON-INFLAMED    Physical Exam:  Anatomic Location Affected:  Trunk and upper extremities      Assessment and Plan:  Based on a thorough discussion of this condition and the management approach to it (including a comprehensive discussion of the known risks, side effects and potential benefits of treatment), the patient (family) agrees to implement the following specific plan:  Reassure benign    Seborrheic Keratosis  A seborrheic keratosis is a harmless warty spot that appears during adult life as a common sign of skin aging  Seborrheic keratoses can arise on any area of skin, covered or uncovered, with the usual exception of the palms and soles  They do not arise from mucous membranes  Seborrheic keratoses can have highly variable appearance  Seborrheic keratoses are extremely common  It has been estimated that over 90% of adults over the age of 61 years have one or more of them  They occur in males and females of all races, typically beginning to erupt in the 35s or 45s  They are uncommon under the age of 21 years  The precise cause of seborrhoeic keratoses is not known  Seborrhoeic keratoses are considered degenerative in nature  As time goes by, seborrheic keratoses tend to become more numerous  Some people inherit a tendency to develop a very large number of them; some people may have hundreds of them  The name "seborrheic keratosis" is misleading, because these lesions are not limited to a seborrhoeic distribution (scalp, mid-face, chest, upper back), nor are they formed from sebaceous glands, nor are they associated with sebum -- which is greasy    Seborrheic keratosis may also be called "SK," "Kaleb K," "basal cell papilloma," "senile wart," or "barnacle "      Researchers have noted:  Eruptive seborrhoeic keratoses can follow sunburn or dermatitis  Skin friction may be the reason they appear in body folds  Viral cause (e g , human papillomavirus) seems unlikely  Stable and clonal mutations or activation of FRFR3, PIK3CA, PAZ, AKT1 and EGFR genes are found in seborrhoeic keratoses  Seborrhoeic keratosis can arise from solar lentigo  FRFR3 mutations also arise in solar lentigines  These mutations are associated with increased age and location on the head and neck, suggesting a role of ultraviolet radiation in these lesions  Seborrheic keratoses do not harbour tumour suppressor gene mutations  Epidermal growth factor receptor inhibitors, which are used to treat some cancers, often result in an increase in verrucal (warty) keratoses  There is no easy way to remove multiple lesions on a single occasion  Unless a specific lesion is "inflamed" and is causing pain or stinging/burning or is bleeding, most insurance companies do not authorize treatment  ACTINIC KERATOSIS    Physical Exam:  Anatomic Location Affected: Face and left helix    Assessment and Plan:  Based on a thorough discussion of this condition and the management approach to it (including a comprehensive discussion of the known risks, side effects and potential benefits of treatment), the patient (family) agrees to implement the following specific plan:  Treated with cryotherapy today; written and verbal consent obtained       Patient instructions: Your pre-cancerous lesions (called actinic keratosis) were treated with liquid nitrogen today  The treated areas will get more red, crusted over the next few days  There might be some blistering  Apply vaseline to the treated area for the next week to help it heal fully  Do not pick at the area  Return in 3-4 weeks for another round of liquid nitrogen treatment if lesion(s)  fails to fully resolve      HISTORY OF SQUAMOUS CELL CARCINOMA     Physical Exam:      Assessment and Plan:  Based on a thorough discussion of this condition and the management approach to it (including a comprehensive discussion of the known risks, side effects and potential benefits of treatment), the patient (family) agrees to implement the following specific plan:  Continue routine skin exams  Recommend sun protection SPF 30 or higher    How can SCC be prevented? The most important way to prevent SCC is to avoid sunburn  This is especially important in childhood and early life  Fair skinned individuals and those with a personal or family history of BCC should protect their skin from sun exposure daily, year-round and lifelong  Stay indoors or under the shade in the middle of the day   Wear covering clothing   Apply high protection factor SPF50+ broad-spectrum sunscreens generously to exposed skin if outdoors   Avoid indoor tanning (sun beds, solaria)      What is the outlook for SCC? Most SCC are cured by treatment  Cure is most likely if treatment is undertaken when the lesion is small  A small percent of SCC's can spread to lymph  nodes and long term monitoring is indicated  They are also at increased risk of other skin cancers, especially melanoma  Regular self-skin examinations and long-term annual skin checks by an experienced health professional are recommended

## 2023-01-17 NOTE — PROGRESS NOTES
[facility] Note     Patient Name: Marcelo Carr  Encounter Date: 1/17/2023     Have you been cared for by a [place] [id number] Dermatologist in the last 3 years and, if so, which description applies to you? Yes  I have been here within the last 3 years, and my medical history has NOT changed since that time  I am MALE/not capable of bearing children  REVIEW OF SYSTEMS:  Have you recently had or currently have any of the following? · No changes in my recent health  PAST MEDICAL HISTORY:  Have you personally ever had or currently have any of the following? If "YES," then please provide more detail  · No changes in my medical history  FAMILY HISTORY:  Any "first degree relatives" (parent, brother, sister, or child) with the following? • No changes in my family's known health  PATIENT EXPERIENCE:    • Do you want the Dermatologist to perform a COMPLETE skin exam today including a clinical examination under the "bra and underwear" areas? NO, upper body only  • If necessary, do we have your permission to call and leave a detailed message on your Preferred Phone number that includes your specific medical information?   Yes      Allergies   Allergen Reactions   • Bcg Live Other (See Comments)     Shakes, became very ill   • Timolol Shortness Of Breath   • Mitomycin Rash     Chronic rash which lasted for months   • Xalatan [Latanoprost]      Back ache-severe   • Brinzolamide-Brimonidine GI Intolerance   • Carboxymethylcellulose    • Dorzolamide GI Intolerance   • Lipitor [Atorvastatin] GI Intolerance     Stomach pain   • Other Rash     Cat Scan Dye      Current Outpatient Medications:   •  albuterol (2 5 mg/3 mL) 0 083 % nebulizer solution, Inhale 2 5 mg every 4 (four) hours as needed, Disp: , Rfl:   •  albuterol (PROVENTIL HFA,VENTOLIN HFA) 90 mcg/act inhaler, Inhale 2 puffs every 6 (six) hours as needed, Disp: , Rfl:   •  amLODIPine (NORVASC) 2 5 mg tablet, Take 2 5 mg by mouth 2 (two) times a day , Disp: , Rfl:   •  aspirin 81 MG tablet, Take 81 mg by mouth daily in the early morning Last dose 6/12/20, Disp: , Rfl:   •  bimatoprost (LUMIGAN) 0 01 % ophthalmic drops, Administer 1 drop into the left eye daily at bedtime, Disp: , Rfl:   •  cholecalciferol (VITAMIN D3) 1,000 units tablet, Take 1,000 Units by mouth daily, Disp: , Rfl:   •  dupilumab (Dupixent) subcutaneous injection, Inject 2 mL (300 mg total) under the skin every 14 (fourteen) days, Disp: 4 mL, Rfl: 13  •  furosemide (LASIX) 20 mg tablet, Take 20 mg by mouth daily, Disp: , Rfl:   •  GLUCOSAMINE HCL PO, Take 1 tablet by mouth daily in the early morning , Disp: , Rfl:   •  ipratropium (ATROVENT) 0 02 % nebulizer solution, INHALE THE CONTENTS OF 1 VIAL VIA NEBULIZER THREE TIMES DAILY, Disp: , Rfl:   •  Lotemax SM 0 38 % GEL, INSTILL 1 DROP IN THE LEFT EYE ONCE DAILY, Disp: , Rfl:   •  Multiple Vitamins-Minerals (PRESERVISION AREDS 2 PO), Take by mouth 2 (two) times a day, Disp: , Rfl:   •  rosuvastatin (CRESTOR) 20 MG tablet, Take 20 mg by mouth daily at bedtime , Disp: , Rfl:   •  amoxicillin (AMOXIL) 500 mg capsule, TAKE 4 CAPSULES BY MOUTH ONE HOUR PRIOR TO APPOINTMENT (Patient not taking: No sig reported), Disp: , Rfl:   •  benzonatate (TESSALON) 200 MG capsule, Take 1 capsule (200 mg total) by mouth 3 (three) times a day (Patient not taking: Reported on 1/17/2023), Disp: 60 capsule, Rfl: 1  •  Brinzolamide-Brimonidine 1-0 2 % SUSP, Apply to eye 2 (two) times a day Left eye only (Patient not taking: Reported on 1/17/2023), Disp: , Rfl:   •  Clobetasol Prop Emollient Base 0 05 % emollient cream, Apply topically 2 (two) times a day (Patient not taking: Reported on 1/17/2023), Disp: 30 g, Rfl: 0  •  hydrochlorothiazide (HYDRODIURIL) 25 mg tablet, Take 25 mg by mouth daily (Patient not taking: Reported on 1/17/2023), Disp: , Rfl:   •  Netarsudil Dimesylate 0 02 % SOLN, Apply to eye (Patient not taking: Reported on 1/17/2023), Disp: , Rfl:   • omeprazole (PriLOSEC) 40 MG capsule, Take 1 capsule (40 mg total) by mouth daily (Patient not taking: Reported on 1/20/2022 ), Disp: 30 capsule, Rfl: 0  •  potassium chloride (K-DUR,KLOR-CON) 20 mEq tablet, Take 20 mEq by mouth daily, Disp: , Rfl:   •  predniSONE 5 mg tablet, Two tablets daily (Patient not taking: Reported on 7/27/2022), Disp: 60 tablet, Rfl: 1  •  triamcinolone (KENALOG) 0 1 % cream, Apply topically to affected areas twice a day (Patient not taking: Reported on 1/17/2023), Disp: 453 6 g, Rfl: 0  •  triamcinolone (KENALOG) 0 1 % ointment, Apply topically to the affected areas 2x/day  Do not apply on face and groin  (Patient not taking: Reported on 1/17/2023), Disp: 454 g, Rfl: 1          • Whom besides the patient is providing clinical information about today's encounter?   o NO ADDITIONAL HISTORIAN (patient alone provided history) and wife    Physical Exam and Assessment/Plan by Diagnosis:    1  ATOPIC DERMATITIS (SIGNIFICANTLY IMPROVED)    Physical Exam:  • Anatomic Location Affected:  Trunk and extremities  • Morphological Description:  Clear  • Body Surface Area Today:  0    Additional History of Present Condition:  Patient is currently on [address] and per wife he is "clear"  PA is good until 3/25/2023  Patien has tried to stop and flares  Patient had severe atopic dermatitis in the past  He has a history of active bladder cancer so dupixent is a wonderful option for him  Assessment and Plan:  Based on a thorough discussion of this condition and the management approach to it (including a comprehensive discussion of the known risks, side effects and potential benefits of treatment), the patient (family) agrees to implement the following specific plan:    • Continue Dupixent 300 mg every 2 weeks subcutaneously  • Follow-up in clinic in 6 months to make sure prior authorization has been performed and patient is not flaring if  there is delay in obtaining continuation for dupixent  Assessment and Plan:   Atopic Dermatitis is a chronic, itchy skin condition that is very common in children but may occur at any age  It is also known as “eczema” or “atopic eczema ” It is the most common form of dermatitis  Atopic dermatitis usually occurs in people who have an “atopic tendency ”  This means they may develop any or all of these closely linked conditions: Atopic dermatitis, asthma, hay fever (allergic rhinitis), eosinophilic esophagitis, and gastroenteritis  Often these conditions run within families with a parent, child or sibling also affected  A family history of asthma, eczema or hay fever is particularly useful in diagnosing atopic dermatitis in infants  Atopic dermatitis arises because of a complex interaction of genetic and environmental factors  These include defects in skin barrier function making the skin more susceptible to irritation by soap and other contact irritants, the weather, temperature and non-specific triggers  There is also an element of immune system dysregulation that is often present  By definition, it is chronic and has a "waxing-waning" nature; flares should be expected but with good education and treatment strategies can be minimized  Some specific tips we discussed:  • Dry skin care  • Using only mild cleansers (hypoallergenic and without fragrances) and fragrance free detergent (not “unscented” products which contain a masking agent); we discussed avoiding irritants/fragranced products  • The importance of regular application of moisturizers daily (at least 3 times a day)  • The known and theoretical side effects of steroids at length, including but not limited to atrophy of skin and increased pressure in eye (glaucoma) and clouding of the eye's lens (cataracts) if used in or around the eye for extended durations  • The specific over-the-counter interventions and medications    • Side effects, risks and benefits of topical and oral medications discussed  • After lengthy discussion of etiology and treatment options, we decided to implement the following personalized treatment plan:      EDUCATION AS INTERVENTION! WHAT IS ATOPIC DERMATITIS? Atopic dermatitis (also called “eczema”) is a condition of the skin where the skin is dry, red, and itchy  The main function of the skin is to provide a barrier from the environment and is also the first defense of the immune system  In atopic dermatitis the skin barrier is decreased or disrupted, and the skin is easily irritated  As a result, moisture escapes the skin more easily, and environmental allergens and microbes can enter the skin more easily  Consequently, the skin's immune system is altered  If there are increased allergic type cells in the skin, the skin may become red and “hyper-excitable ” This leads to itching and a subsequent rash  WHY DO PEOPLE GET ATOPIC DERMATITIS? There is no single answer because many factors are involved  It is likely a combination of genetic makeup and environmental triggers and/or exposures  Excessive drying or sweating of the skin, Irritating soaps, dust mites, and pet dander are some of the more common triggers  There is no blood test that can be done to confirm this diagnosis  The history and appearance of the skin is usually sufficient for a diagnosis  However, in some cases if the rash does not fit with the history or respond appropriately to treatment, a skin biopsy may be helpful  Many children do outgrow atopic dermatitis or get better; however, many continue to have sensitive skin into adulthood  Asthma and hay fever are often seen in many patients with atopic dermatitis; however, asthma flares do not necessarily occur at the same time as skin flares  PREVENTING FLARES OF ATOPIC DERMATITIS  The first step is to maintain the skin's barrier function  Keep the skin well moisturized  Avoid irritants and triggers    Use prescribed medicine when there are red or rough areas to help the skin to return to normal as quickly as possible  Try to limit scratching  If you keep the skin well moisturized, and avoid coming in contact with things you know irritate your child's skin, there will be less flares  However, some flares of atopic dermatitis are beyond your control  You should work with your health care provider to come up with a plan that minimizes flares while minimizing long term use of medications that suppress the immune system  WHAT ARE SOME OF THE TRIGGERS? Triggers are different for different people  The most common triggers are:  • Heat and sweat for some individuals, cold weather for others  • House dust mites, pet fur  • Wool; synthetic fabrics like nylon; dyed fabrics  • Tobacco smoke   • Fragrances in: shampoos, soaps, lotions, laundry detergents, fabric softeners  • Saliva or prolonged exposure to water  WHAT ABOUT FOOD ALLERGIES? This is a very controversial topic, as many believe that food allergies are responsible for skin flares  In some cases, specific foods may cause worsening of atopic dermatitis; however this occurs in a minority of cases and usually happens within a few hours of ingestion  While food allergy is more common in children with eczema, foods are specific triggers for flares in only a small percentage of children  If you notice that the skin flares after certain foods you can see if eliminating one food at time makes a difference, as long as your child can still enjoy a well-balanced diet  There are blood (RAST) and skin (PRICK) tests that can check for allergies, but they are often positive in children who are not truly allergic  Therefore it is important that you work with your allergist and dermatologist to determine which foods are relevant and causing true symptoms    Extreme food elimination diets without the guidance of your doctor, which have become more popular in recent years, may even result in worsening of the skin rash due to malnutrition and avoidance of essential nutrients  TREATMENT  Treatments are aimed at minimizing exposure to irritating factors and decreasing  the skin inflammation which results in an itchy rash  There are many different treatment options, which depend on your child's rash, its location, and severity  Topical treatments include corticosteroids and steroid-like creams such as Protopic, Elidel, and Eucrisa, which are believed to not thin the skin  Please read the discussions below regarding risks and benefits of all of these creams  Occasionally bacterial or viral infections can occur which flare the skin and require oral and/or topical antibiotics or antivirals  In some cases bleach baths 2-3 times weekly can be helpful to prevent recurrent infection  For severe disease, strong oral medications such as corticosteroids, methotrexate or azathioprine (Imuran) may be needed  These medications require close monitoring and follow-up  You should discuss the risks/ benefits/alternatives of these medications with your health care provider to come up with the best treatment plan for your child  1) Use moisturizer all over the entire body at least THREE TIMES a day  This keeps the skin moisturized to restore the barrier function  Find a cream or ointment that your child likes - this is the most important  The medicines do not work in the bottle  The thicker the moisturizer, generally the better barrier it provides  Ointments often moisturize better than creams; and creams work better than lotions  Lotions are more useful during the summer when thick greasy ointments are uncomfortable  If you put moisturizer on the skin after bathing, while the skin is damp, it is twice as effective  The moisturizer provides a seal holding the water in the skin    You may bathe your child in warm - not hot - water, for short periods of time (no more than 5-10 minutes at a time) once a day if they like  Lightly pat your child dry with a towel and, while the skin is still damp, (within 3 minutes) apply a moisturizer from head to toe  If your child is using a medicated cream, apply it and allow it to absorb completely BEFORE you apply the moisturizer  2) Apply the prescription medication TWICE A DAY to only the red, rough areas on the skin OR AS [place]  Put the medication on your fingers and gently rub it into the areas  Usually the medicine will help an area within a few days time  Try to put the medicine on for two days after you have noticed that the redness is no longer present; this will help the redness from returning  The severity of the rash and the strength and usage of the medication will determine how quickly you see improvement  It is important that you do not overuse steroid creams, and if you notice a thin, shiny appearance to the skin or broken blood vessels, you should stop using the cream and consult your health care provider regarding possible overuse/overthinning of the skin  The face, armpits and groin have particularly thin and sensitive skin and are therefore most at risk for bad results if steroids are over-used in these sites  3) Avoid triggers  Some children have specific things that trigger itching and rashes, while others may have none that can be identified  It may require a little bit of trial and error to see what applies to your child  Also, triggers can change over time for your child  The most common triggers are listed above; start with these  Avoid the use of fabric softeners in the washing machine or dryer sheets (unless they are fragrance-free)  Try to use laundry detergents, soaps and shampoos that are fragrance-free  You may find it helpful to double-rinse your clothes  Some children are sensitive to house dust mites and they may benefit from a plastic mattress wrap    While food allergy is more common in children with eczema, foods are specific triggers for flares in only a small percentage of children  If you notice that the skin flares after certain foods you can see if eliminating one food at time makes a difference, as long as your child can still enjoy a well-balanced diet  4) Consider using a medication like an anti-histamine by mouth to help control the itching  Scratching only makes the skin more reactive and the barrier function even more disrupted  It can cause both children and their parents to lose sleep! There are different types of anti-itch medications  Some cause more drowsiness than others  Both types are acceptable depending on your child and your preference  Start with Benadryl and if that does not work, ask for a prescription “antihistamine ”    5) About the prescription creams:  Corticosteroid creams and ointments (generally things with "-one" or "tita" on the end of their names): The strength of the cream or ointment depends on the name of the active ingredient  The numbers at the end do not indicate the relative strength  Thus triamcinolone 0 1% ointment, considered a mid-strength corticosteroid, is much stronger than hydrocortisone 1% even though the number following the name is much lower  Topical corticosteroids are very effective in treating atopic dermatitis  When used in the manner prescribed (to rashy areas of skin and for no more than a few weeks at a time to any one area) they are very safe  These are corticosteroids and are anti-inflammatory, not the “anabolic steroids” like those used illegally by some athletes  Topical non-steroid creams and ointments (immunomodulators): These creams and ointments are also called topical calcineurin inhibitors (TCIs)  These include Protopic ointment and Elidel cream  Crisaborole 2% Thereasa Spatz) is a prescription ointment that targets an enzyme called PDE4 (phosphodiesterase 4)    It is used on the skin topically to treat mild-to-moderate eczema in adults and children 3years of age and older  In total, these nonsteroidal prescriptions are used to help decrease itching and redness in the skin  They are not as strong as most steroid creams; however, it is believed that they do not thin the skin when overused  They are generally used as second-line medications, though they may be used alone or in conjunction with topical steroids  In sensitive areas such as the face, underarms or groin, they are often recommended  They can sting inflamed skin, but are generally well tolerated once the skin is healing  The FDA placed a “black-box” warning on both Elidel and Protopic in 2006 based on animal studies using the medications  Some animals developed skin cancer and lymphoma  Subsequently, the FDA released a statement that there is no causal relationship between the two medications and cancer  Because of this concern, there are ongoing studies to evaluate this relationship in humans  So far, there are studies that support the safety of these medications  One showed that the rates of cancer in patient using these medications topically were less than the rates of the general population and another showed that in patient's using the medication over a large area of the body, the levels of the medication in the blood was undetectable  As for Eucrisa, this product is only approved for the topical treatment of mild-to-moderate eczema in patients 3years of age and older; use of the medication in kids younger than 2 is considered “off label” and has not been formally studied  Burning and stinging are the most commonly reported side effects of this medication  Rarely, this product has been known to cause hives and hypersensitivity reactions; discontinue its use if you develop severe itching, swelling, or redness in the area of application      2  SEBORRHEIC KERATOSIS; NON-INFLAMED AND INFLAMED    Physical Exam:  • Anatomic Location Affected:  Trunk and upper extremities  • Morphological Description:  warty textured, yellow to brownish-grey discrete, "stuck-on" appearing papules  Additional History of Present Condition:  Present on exam    Assessment and Plan:  Based on a thorough discussion of this condition and the management approach to it (including a comprehensive discussion of the known risks, side effects and potential benefits of treatment), the patient (family) agrees to implement the following specific plan:  • Reassure benign  • Continue to monitor     Seborrheic Keratosis  A seborrheic keratosis is a harmless warty spot that appears during adult life as a common sign of skin aging  Seborrheic keratoses can arise on any area of skin, covered or uncovered, with the usual exception of the palms and soles  They do not arise from mucous membranes  Seborrheic keratoses can have highly variable appearance  Seborrheic keratoses are extremely common  It has been estimated that over 90% of adults over the age of 61 years have one or more of them  They occur in males and females of all races, typically beginning to erupt in the 35s or 45s  They are uncommon under the age of 21 years  The precise cause of seborrhoeic keratoses is not known  Seborrhoeic keratoses are considered degenerative in nature  As time goes by, seborrheic keratoses tend to become more numerous  Some people inherit a tendency to develop a very large number of them; some people may have hundreds of them  The name "seborrheic keratosis" is misleading, because these lesions are not limited to a seborrhoeic distribution (scalp, mid-face, chest, upper back), nor are they formed from sebaceous glands, nor are they associated with sebum -- which is greasy    Seborrheic keratosis may also be called "SK," "Seb K," "basal cell papilloma," "senile wart," or "barnacle "      Researchers have noted:  • Eruptive seborrhoeic keratoses can follow sunburn or dermatitis  • Skin friction may be the reason they appear in body folds  • Viral cause (e g , human papillomavirus) seems unlikely  • Stable and clonal mutations or activation of FRFR3, PIK3CA, PAZ, AKT1 and EGFR genes are found in seborrhoeic keratoses  • Seborrhoeic keratosis can arise from solar lentigo  • FRFR3 mutations also arise in solar lentigines  These mutations are associated with increased age and location on the head and neck, suggesting a role of ultraviolet radiation in these lesions  • Seborrheic keratoses do not harbour tumour suppressor gene mutations  • Epidermal growth factor receptor inhibitors, which are used to treat some cancers, often result in an increase in verrucal (warty) keratoses  There is no easy way to remove multiple lesions on a single occasion  Unless a specific lesion is "inflamed" and is causing pain or stinging/burning or is bleeding, most insurance companies do not authorize treatment  3  ACTINIC KERATOSIS    Physical Exam:  • Anatomic Location Affected: Face and left helix  • Morphological Description:  Scaling erythematous macules      Assessment and Plan:  Based on a thorough discussion of this condition and the management approach to it (including a comprehensive discussion of the known risks, side effects and potential benefits of treatment), the patient (family) agrees to implement the following specific plan:  • Treated with cryotherapy today; written and verbal consent obtained     PROCEDURE:  DESTRUCTION OF PRE-MALIGNANT LESIONS  After a thorough discussion of treatment options and risk/benefits/alternatives (including but not limited to local pain, scarring, dyspigmentation, blistering, and possible superinfection), verbal and written consent were obtained and the aforementioned lesions were treated on with cryotherapy using liquid nitrogen x 2 cycles for 5-10 seconds  The patient tolerated the procedure well, and after-care instructions were provided  • TOTAL NUMBER of 4 pre-malignant lesions were treated today on the ANATOMIC LOCATION: Right cheek x2, left cheek and left helix  Patient instructions: Your pre-cancerous lesions (called actinic keratosis) were treated with liquid nitrogen today  The treated areas will get more red, crusted over the next few days  There might be some blistering  Apply vaseline to the treated area for the next week to help it heal fully  Do not pick at the area  Return in 3-4 weeks for another round of liquid nitrogen treatment if lesion(s)  fails to fully resolve  4  HISTORY OF SQUAMOUS CELL CARCINOMA       HISTORY OF BLADDER CANCER     Physical Exam:  • Anatomic Location Affected:  Left elbow  • Morphological Description of Scar:  Well healed  • Suspected Recurrence: no  • Regional adenopathy: no    Additional History of Present Condition:  Treated in 2021 with excision  Assessment and Plan:  Based on a thorough discussion of this condition and the management approach to it (including a comprehensive discussion of the known risks, side effects and potential benefits of treatment), the patient (family) agrees to implement the following specific plan:    • Continue routine skin exams  • The nature of sun-induced photo-aging and skin cancers is discussed  Sun avoidance, protective clothing, and the use of 30-SPF sunscreens is advised  Observe closely for skin damage/changes, and call if such occurs  How can SCC be prevented? The most important way to prevent SCC is to avoid sunburn  This is especially important in childhood and early life  Fair skinned individuals and those with a personal or family history of BCC should protect their skin from sun exposure daily, year-round and lifelong    • Stay indoors or under the shade in the middle of the day   • Wear covering clothing   • Apply high protection factor SPF50+ broad-spectrum sunscreens generously to exposed skin if outdoors   • Avoid indoor tanning (sun beds, solaria)      What is the outlook for SCC? Most SCC are cured by treatment  Cure is most likely if treatment is undertaken when the lesion is small  A small percent of SCC's can spread to lymph  nodes and long term monitoring is indicated  They are also at increased risk of other skin cancers, especially melanoma  Regular self-skin examinations and long-term annual skin checks by an experienced health professional are recommended  Scribe Attestation    I,:  Ashley Arauz MA am acting as a scribe while in the presence of the attending physician :       I,:  Brenda Magallon MD personally performed the services described in this documentation    as scribed in my presence :         The patient was seen and discussed with Dr Brenda Magallon       RTC: 6 months for atopic dermatitis follow-up    Daya Mullen  Dermatology PGY-4 Resident Physician

## 2023-02-01 ENCOUNTER — IOP CHECK (OUTPATIENT)
Dept: URBAN - METROPOLITAN AREA CLINIC 6 | Facility: CLINIC | Age: 88
End: 2023-02-01

## 2023-02-01 ENCOUNTER — TELEPHONE (OUTPATIENT)
Dept: DERMATOLOGY | Facility: CLINIC | Age: 88
End: 2023-02-01

## 2023-02-01 DIAGNOSIS — H18.512: ICD-10-CM

## 2023-02-01 DIAGNOSIS — H44.521: ICD-10-CM

## 2023-02-01 DIAGNOSIS — H40.1122: ICD-10-CM

## 2023-02-01 DIAGNOSIS — H35.3121: ICD-10-CM

## 2023-02-01 DIAGNOSIS — Z96.1: ICD-10-CM

## 2023-02-01 PROCEDURE — 92133 CPTRZD OPH DX IMG PST SGM ON: CPT

## 2023-02-01 PROCEDURE — 92020 GONIOSCOPY: CPT

## 2023-02-01 PROCEDURE — 92083 EXTENDED VISUAL FIELD XM: CPT

## 2023-02-01 PROCEDURE — 92202 OPSCPY EXTND ON/MAC DRAW: CPT

## 2023-02-01 PROCEDURE — 92014 COMPRE OPH EXAM EST PT 1/>: CPT

## 2023-02-01 ASSESSMENT — VISUAL ACUITY: OS_CC: 20/60

## 2023-02-01 NOTE — TELEPHONE ENCOUNTER
Prior authorization for continuation of Dupixent was faxed to insurance along with recent clinical note and demographics and marked as urgent  Form was uploaded into patients chart

## 2023-02-03 ENCOUNTER — TELEPHONE (OUTPATIENT)
Dept: DERMATOLOGY | Age: 88
End: 2023-02-03

## 2023-02-03 DIAGNOSIS — L20.9 ATOPIC DERMATITIS, UNSPECIFIED TYPE: Primary | ICD-10-CM

## 2023-02-03 RX ORDER — DUPILUMAB 300 MG/2ML
INJECTION, SOLUTION SUBCUTANEOUS
Qty: 2 ML | Refills: 13 | Status: SHIPPED | OUTPATIENT
Start: 2023-02-03

## 2023-02-03 NOTE — TELEPHONE ENCOUNTER
Farhat Byrnes,    I sent in a [facility] is sent if you could please let the patient know and if he has any trouble to let us know       Thanks,  RENÉ Home	[place]

## 2023-02-06 NOTE — TELEPHONE ENCOUNTER
Spoke with patient wife she patient is hard of hearing  Wife stated they have enough of the dupixent inj to last for the rest of the month   Advised her to give us a call if there are any issues with next shipment

## 2023-04-07 ENCOUNTER — ESTABLISHED COMPREHENSIVE EXAM (OUTPATIENT)
Dept: URBAN - METROPOLITAN AREA CLINIC 6 | Facility: CLINIC | Age: 88
End: 2023-04-07

## 2023-04-07 DIAGNOSIS — H35.3221: ICD-10-CM

## 2023-04-07 DIAGNOSIS — H44.521: ICD-10-CM

## 2023-04-07 PROCEDURE — 67028 INJECTION EYE DRUG: CPT

## 2023-04-07 PROCEDURE — 92250 FUNDUS PHOTOGRAPHY W/I&R: CPT | Mod: NC

## 2023-04-07 PROCEDURE — 92202 OPSCPY EXTND ON/MAC DRAW: CPT | Mod: NC

## 2023-04-07 PROCEDURE — 92134 CPTRZ OPH DX IMG PST SGM RTA: CPT

## 2023-04-07 PROCEDURE — 92014 COMPRE OPH EXAM EST PT 1/>: CPT | Mod: 25

## 2023-04-07 ASSESSMENT — VISUAL ACUITY
OU_CC: 20/60
OS_PH: 20/60-
OS_CC: 20/80

## 2023-04-07 ASSESSMENT — TONOMETRY: OS_IOP_MMHG: 7

## 2023-05-12 ENCOUNTER — ESTABLISHED COMPREHENSIVE EXAM (OUTPATIENT)
Dept: URBAN - METROPOLITAN AREA CLINIC 6 | Facility: CLINIC | Age: 88
End: 2023-05-12

## 2023-05-12 DIAGNOSIS — H35.3221: ICD-10-CM

## 2023-05-12 DIAGNOSIS — H44.521: ICD-10-CM

## 2023-05-12 PROCEDURE — 92202 OPSCPY EXTND ON/MAC DRAW: CPT | Mod: NC

## 2023-05-12 PROCEDURE — 92134 CPTRZ OPH DX IMG PST SGM RTA: CPT

## 2023-05-12 PROCEDURE — PFS EYLEA PFS

## 2023-05-12 PROCEDURE — 92014 COMPRE OPH EXAM EST PT 1/>: CPT | Mod: 25

## 2023-05-12 PROCEDURE — 67028 INJECTION EYE DRUG: CPT

## 2023-05-12 ASSESSMENT — VISUAL ACUITY
OS_CC: J3
OS_CC: 20/80-2

## 2023-05-12 ASSESSMENT — TONOMETRY: OS_IOP_MMHG: 11

## 2023-05-29 NOTE — TELEPHONE ENCOUNTER
I spoke with Mrs Adriana Boateng  She says she recived a notice that benecard was renewed for one year  So we dont have to do anything with this form? ???   Ie I dont believe we have to call them if it was already authorized show

## 2023-06-23 ENCOUNTER — ESTABLISHED COMPREHENSIVE EXAM (OUTPATIENT)
Dept: URBAN - METROPOLITAN AREA CLINIC 6 | Facility: CLINIC | Age: 88
End: 2023-06-23

## 2023-06-23 DIAGNOSIS — H35.3221: ICD-10-CM

## 2023-06-23 DIAGNOSIS — H44.521: ICD-10-CM

## 2023-06-23 PROCEDURE — 67028 INJECTION EYE DRUG: CPT

## 2023-06-23 PROCEDURE — 92014 COMPRE OPH EXAM EST PT 1/>: CPT

## 2023-06-23 PROCEDURE — 92134 CPTRZ OPH DX IMG PST SGM RTA: CPT

## 2023-06-23 PROCEDURE — 92202 OPSCPY EXTND ON/MAC DRAW: CPT | Mod: NC

## 2023-06-23 PROCEDURE — PFS EYLEA PFS

## 2023-06-23 ASSESSMENT — VISUAL ACUITY
OS_PH: 20/50
OS_CC: J5
OS_CC: 20/70+

## 2023-06-23 ASSESSMENT — TONOMETRY: OS_IOP_MMHG: 8

## 2023-06-27 ENCOUNTER — PROBLEM (OUTPATIENT)
Dept: URBAN - METROPOLITAN AREA CLINIC 6 | Facility: CLINIC | Age: 88
End: 2023-06-27

## 2023-06-27 DIAGNOSIS — H35.3221: ICD-10-CM

## 2023-06-27 DIAGNOSIS — H44.521: ICD-10-CM

## 2023-06-27 DIAGNOSIS — H01.026: ICD-10-CM

## 2023-06-27 PROCEDURE — 92012 INTRM OPH EXAM EST PATIENT: CPT

## 2023-06-27 ASSESSMENT — VISUAL ACUITY: OS_CC: 20/60+1

## 2023-06-27 ASSESSMENT — TONOMETRY: OS_IOP_MMHG: 10

## 2023-07-06 ENCOUNTER — FOLLOW UP (OUTPATIENT)
Dept: URBAN - METROPOLITAN AREA CLINIC 6 | Facility: CLINIC | Age: 88
End: 2023-07-06

## 2023-07-06 DIAGNOSIS — H44.521: ICD-10-CM

## 2023-07-06 DIAGNOSIS — H40.1122: ICD-10-CM

## 2023-07-06 DIAGNOSIS — Z96.1: ICD-10-CM

## 2023-07-06 DIAGNOSIS — H01.026: ICD-10-CM

## 2023-07-06 DIAGNOSIS — H35.3221: ICD-10-CM

## 2023-07-06 DIAGNOSIS — H18.512: ICD-10-CM

## 2023-07-06 PROCEDURE — 92083 EXTENDED VISUAL FIELD XM: CPT

## 2023-07-06 PROCEDURE — 92014 COMPRE OPH EXAM EST PT 1/>: CPT

## 2023-07-06 PROCEDURE — 92133 CPTRZD OPH DX IMG PST SGM ON: CPT

## 2023-07-06 ASSESSMENT — TONOMETRY: OS_IOP_MMHG: 10

## 2023-07-06 ASSESSMENT — VISUAL ACUITY: OS_CC: 20/60+1

## 2023-08-03 ENCOUNTER — TELEPHONE (OUTPATIENT)
Dept: DERMATOLOGY | Facility: CLINIC | Age: 88
End: 2023-08-03

## 2023-08-03 NOTE — TELEPHONE ENCOUNTER
Pt wife called and wants to make an appt for pt. Pt wife states he is supposed to come in every few months. 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 You're at [facility] for the [facility] on 8/8 but wasn't sure if this should be scheduled for there.  Please advise, thank you

## 2023-08-08 ENCOUNTER — OFFICE VISIT (OUTPATIENT)
Dept: DERMATOLOGY | Facility: CLINIC | Age: 88
End: 2023-08-08
Payer: COMMERCIAL

## 2023-08-08 VITALS — BODY MASS INDEX: 30.34 KG/M2 | HEIGHT: 66 IN | WEIGHT: 188.8 LBS | TEMPERATURE: 97.3 F

## 2023-08-08 DIAGNOSIS — L20.84 INTRINSIC ATOPIC DERMATITIS: Primary | ICD-10-CM

## 2023-08-08 PROCEDURE — 99213 OFFICE O/P EST LOW 20 MIN: CPT | Performed by: DERMATOLOGY

## 2023-08-08 RX ORDER — AZELASTINE 1 MG/ML
2 SPRAY, METERED NASAL 2 TIMES DAILY
COMMUNITY
Start: 2023-03-07

## 2023-08-08 NOTE — PATIENT INSTRUCTIONS
ATOPIC DERMATITIS (SIGNIFICANTLY IMPROVED)     Assessment and Plan:  Based on a thorough discussion of this condition and the management approach to it (including a comprehensive discussion of the known risks, side effects and potential benefits of treatment), the patient (family) agrees to implement the following specific plan:     Continue Dupixent 300 mg every 2 weeks subcutaneously  Follow-up in clinic in 6 months to make sure prior authorization has been performed and patient is not flaring if   there is delay in obtaining continuation for dupixent. Obtain new PA for Dupixent -patient has 6 needles left                Assessment and Plan:       Atopic Dermatitis is a chronic, itchy skin condition that is very common in children but may occur at any age. It is also known as “eczema” or “atopic eczema.” It is the most common form of dermatitis. Atopic dermatitis usually occurs in people who have an “atopic tendency.”  This means they may develop any or all of these closely linked conditions: Atopic dermatitis, asthma, hay fever (allergic rhinitis), eosinophilic esophagitis, and gastroenteritis. Often these conditions run within families with a parent, child or sibling also affected. A family history of asthma, eczema or hay fever is particularly useful in diagnosing atopic dermatitis in infants. Atopic dermatitis arises because of a complex interaction of genetic and environmental factors. These include defects in skin barrier function making the skin more susceptible to irritation by soap and other contact irritants, the weather, temperature and non-specific triggers. There is also an element of immune system dysregulation that is often present. By definition, it is chronic and has a "waxing-waning" nature; flares should be expected but with good education and treatment strategies can be minimized. Some specific tips we discussed:  Dry skin care.   Using only mild cleansers (hypoallergenic and without fragrances) and fragrance free detergent (not “unscented” products which contain a masking agent); we discussed avoiding irritants/fragranced products. The importance of regular application of moisturizers daily (at least 3 times a day)  The known and theoretical side effects of steroids at length, including but not limited to atrophy of skin and increased pressure in eye (glaucoma) and clouding of the eye's lens (cataracts) if used in or around the eye for extended durations. The specific over-the-counter interventions and medications. Side effects, risks and benefits of topical and oral medications discussed. After lengthy discussion of etiology and treatment options, we decided to implement the following personalized treatment plan:        EDUCATION AS INTERVENTION! WHAT IS ATOPIC DERMATITIS? Atopic dermatitis (also called “eczema”) is a condition of the skin where the skin is dry, red, and itchy. The main function of the skin is to provide a barrier from the environment and is also the first defense of the immune system. In atopic dermatitis the skin barrier is decreased or disrupted, and the skin is easily irritated. As a result, moisture escapes the skin more easily, and environmental allergens and microbes can enter the skin more easily. Consequently, the skin's immune system is altered. If there are increased allergic type cells in the skin, the skin may become red and “hyper-excitable.” This leads to itching and a subsequent rash. WHY DO PEOPLE GET ATOPIC DERMATITIS? There is no single answer because many factors are involved. It is likely a combination of genetic makeup and environmental triggers and/or exposures. Excessive drying or sweating of the skin, Irritating soaps, dust mites, and pet dander are some of the more common triggers. There is no blood test that can be done to confirm this diagnosis.  The history and appearance of the skin is usually sufficient for a diagnosis. However, in some cases if the rash does not fit with the history or respond appropriately to treatment, a skin biopsy may be helpful. Many children do outgrow atopic dermatitis or get better; however, many continue to have sensitive skin into adulthood. Asthma and hay fever are often seen in many patients with atopic dermatitis; however, asthma flares do not necessarily occur at the same time as skin flares. PREVENTING FLARES OF ATOPIC DERMATITIS  The first step is to maintain the skin's barrier function. Keep the skin well moisturized. Avoid irritants and triggers. Use prescribed medicine when there are red or rough areas to help the skin to return to normal as quickly as possible. Try to limit scratching. If you keep the skin well moisturized, and avoid coming in contact with things you know irritate your child's skin, there will be less flares. However, some flares of atopic dermatitis are beyond your control. You should work with your health care provider to come up with a plan that minimizes flares while minimizing long term use of medications that suppress the immune system. WHAT ARE SOME OF THE TRIGGERS? Triggers are different for different people. The most common triggers are:  Heat and sweat for some individuals, cold weather for others. House dust mites, pet fur. Wool; synthetic fabrics like nylon; dyed fabrics. Tobacco smoke   Fragrances in: shampoos, soaps, lotions, laundry detergents, fabric softeners. Saliva or prolonged exposure to water. WHAT ABOUT FOOD ALLERGIES? This is a very controversial topic, as many believe that food allergies are responsible for skin flares. In some cases, specific foods may cause worsening of atopic dermatitis; however this occurs in a minority of cases and usually happens within a few hours of ingestion.  While food allergy is more common in children with eczema, foods are specific triggers for flares in only a small percentage of children. If you notice that the skin flares after certain foods you can see if eliminating one food at time makes a difference, as long as your child can still enjoy a well-balanced diet. There are blood (RAST) and skin (PRICK) tests that can check for allergies, but they are often positive in children who are not truly allergic. Therefore it is important that you work with your allergist and dermatologist to determine which foods are relevant and causing true symptoms. Extreme food elimination diets without the guidance of your doctor, which have become more popular in recent years, may even result in worsening of the skin rash due to malnutrition and avoidance of essential nutrients. TREATMENT  Treatments are aimed at minimizing exposure to irritating factors and decreasing  the skin inflammation which results in an itchy rash. There are many different treatment options, which depend on your child's rash, its location, and severity. Topical treatments include corticosteroids and steroid-like creams such as Protopic, Elidel, and Eucrisa, which are believed to not thin the skin. Please read the discussions below regarding risks and benefits of all of these creams. Occasionally bacterial or viral infections can occur which flare the skin and require oral and/or topical antibiotics or antivirals. In some cases bleach baths 2-3 times weekly can be helpful to prevent recurrent infection. For severe disease, strong oral medications such as corticosteroids, methotrexate or azathioprine (Imuran) may be needed. These medications require close monitoring and follow-up. You should discuss the risks/ benefits/alternatives of these medications with your health care provider to come up with the best treatment plan for your child. 1) Use moisturizer all over the entire body at least THREE TIMES a day. This keeps the skin moisturized to restore the barrier function.   Find a cream or ointment that your child likes - this is the most important. The medicines do not work in the bottle. The thicker the moisturizer, generally the better barrier it provides. Ointments often moisturize better than creams; and creams work better than lotions. Lotions are more useful during the summer when thick greasy ointments are uncomfortable. If you put moisturizer on the skin after bathing, while the skin is damp, it is twice as effective. The moisturizer provides a seal holding the water in the skin. You may bathe your child in warm - not hot - water, for short periods of time (no more than 5-10 minutes at a time) once a day if they like. Lightly pat your child dry with a towel and, while the skin is still damp, (within 3 minutes) apply a moisturizer from head to toe. If your child is using a medicated cream, apply it and allow it to absorb completely BEFORE you apply the moisturizer. 2) Apply the prescription medication TWICE A DAY to only the red, [place] [address] [address]  Put the medication on your fingers and gently rub it into the areas. Usually the medicine will help an area within a few days time. Try to put the medicine on for two days after you have noticed that the redness is no longer present; this will help the redness from returning. The severity of the rash and the strength and usage of the medication will determine how quickly you see improvement. It is important that you do not overuse steroid creams, and if you notice a thin, shiny appearance to the skin or broken blood vessels, you should stop using the cream and consult your health care provider regarding possible overuse/overthinning of the skin. The face, armpits and groin have particularly thin and sensitive skin and are therefore most at risk for bad results if steroids are over-used in these sites. 3) Avoid triggers.     Some children have specific things that trigger itching and rashes, while others may have none that can be identified. It may require a little bit of trial and error to see what applies to your child. Also, triggers can change over time for your child. The most common triggers are listed above; start with these. Avoid the use of fabric softeners in the washing machine or dryer sheets (unless they are fragrance-free). Try to use laundry detergents, soaps and shampoos that are fragrance-free. You may find it helpful to double-rinse your clothes. Some children are sensitive to house dust mites and they may benefit from a plastic mattress wrap. While food allergy is more common in children with eczema, foods are specific triggers for flares in only a small percentage of children. If you notice that the skin flares after certain foods you can see if eliminating one food at time makes a difference, as long as your child can still enjoy a well-balanced diet. 4) Consider using a medication like an anti-histamine by mouth to help control the itching. Scratching only makes the skin more reactive and the barrier function even more disrupted. It can cause both children and their parents to lose sleep! There are different types of anti-itch medications. Some cause more drowsiness than others. Both types are acceptable depending on your child and your preference. Start with Benadryl and if that does not work, ask for a prescription “antihistamine.”     5) About the prescription creams:  Corticosteroid creams and ointments (generally things with "-one" or "tita" on the end of their names): The strength of the cream or ointment depends on the name of the active ingredient. The numbers at the end do not indicate the relative strength. Thus triamcinolone 0.1% ointment, considered a mid-strength corticosteroid, is much stronger than hydrocortisone 1% even though the number following the name is much lower.   Topical corticosteroids are very effective in treating atopic dermatitis. When used in the manner prescribed (to rashy areas of skin and for no more than a few weeks at a time to any one area) they are very safe. These are corticosteroids and are anti-inflammatory, not the “anabolic steroids” like those used illegally by some athletes. Topical non-steroid creams and ointments (immunomodulators): These creams and ointments are also called topical calcineurin inhibitors (TCIs). These include Protopic ointment and Elidel cream. Crisaborole 2% [employer]) is a prescription ointment that targets an enzyme called PDE4 (phosphodiesterase 4). It is used on the skin topically to treat mild-to-moderate eczema in adults and children 3years of age and older. In total, these nonsteroidal prescriptions are used to help decrease itching and redness in the skin. They are not as strong as most steroid creams; however, it is believed that they do not thin the skin when overused. They are generally used as second-line medications, though they may be used alone or in conjunction with topical steroids. In sensitive areas such as the face, underarms or groin, they are often recommended. They can sting inflamed skin, but are generally well tolerated once the skin is healing. The FDA placed a “black-box” warning on both Elidel and Protopic in 2006 based on animal studies using the medications. Some animals developed skin cancer and lymphoma. Subsequently, the FDA released a statement that there is no causal relationship between the two medications and cancer. Because of this concern, there are ongoing studies to evaluate this relationship in humans. So far, there are studies that support the safety of these medications.   One showed that the rates of cancer in patient using these medications topically were less than the rates of the general population and another showed that in patient's using the medication over a large area of the body, the levels of the medication in the blood was undetectable. As for Eucrisa, this product is only approved for the topical treatment of mild-to-moderate eczema in patients 3years of age and older; use of the medication in kids younger than 2 is considered “off label” and has not been formally studied. Burning and stinging are the most commonly reported side effects of this medication. Rarely, this product has been known to cause hives and hypersensitivity reactions; discontinue its use if you develop severe itching, swelling, or redness in the area of application.

## 2023-08-08 NOTE — PROGRESS NOTES
[facility] Note     Patient Name: Raphael Palm  Encounter Date: 08/08/2023     Have you been cared for by a [facility] Dermatologist in the last 3 years and, if so, which description applies to you? Yes. I have been here within the last 3 years, and my medical history has NOT changed since that time. I am MALE/not capable of bearing children. REVIEW OF SYSTEMS:  Have you recently had or currently have any of the following? No changes in my recent health. PAST MEDICAL HISTORY:  Have you personally ever had or currently have any of the following? If "YES," then please provide more detail. No changes in my medical history. FAMILY HISTORY:  Any "first degree relatives" (parent, brother, sister, or child) with the following? No changes in my family's known health. PATIENT EXPERIENCE:    Do you want the Dermatologist to perform a COMPLETE skin exam today including a clinical examination under the "bra and underwear" areas? NO  If necessary, do we have your permission to call and leave a detailed message on your Preferred Phone number that includes your specific medical information?   Yes      Allergies   Allergen Reactions   • Bcg Live Other (See Comments)     Shakes, became very ill   • Timolol Shortness Of Breath   • Mitomycin Rash     Chronic rash which lasted for months   • Xalatan [Latanoprost]      Back ache-severe   • Brinzolamide-Brimonidine GI Intolerance   • Carboxymethylcellulose    • Dorzolamide GI Intolerance   • Lipitor [Atorvastatin] GI Intolerance     Stomach pain   • Other Rash     Cat Scan Dye      Current Outpatient Medications:   •  albuterol (2.5 mg/3 mL) 0.083 % nebulizer solution, Inhale 2.5 mg every 4 (four) hours as needed, Disp: , Rfl:   •  albuterol (PROVENTIL HFA,VENTOLIN HFA) 90 mcg/act inhaler, Inhale 2 puffs every 6 (six) hours as needed, Disp: , Rfl:   •  amLODIPine (NORVASC) 2.5 mg tablet, Take 2.5 mg by mouth 2 (two) times a day , Disp: , Rfl:   • aspirin 81 MG tablet, Take 81 mg by mouth daily in the early morning Last dose 6/12/20, Disp: , Rfl:   •  azelastine (ASTELIN) 0.1 % nasal spray, 2 sprays into each nostril 2 (two) times a day, Disp: , Rfl:   •  bimatoprost (LUMIGAN) 0.01 % ophthalmic drops, Administer 1 drop into the left eye daily at bedtime, Disp: , Rfl:   •  cholecalciferol (VITAMIN D3) 1,000 units tablet, Take 1,000 Units by mouth daily, Disp: , Rfl:   •  Dupilumab (Dupixent) 300 MG/2ML SOPN, INJECT 1 PEN (300 MG) UNDER THE SKIN EVERY 2 WEEKS, Disp: 8 mL, Rfl: 2  •  furosemide (LASIX) 20 mg tablet, Take 20 mg by mouth daily, Disp: , Rfl:   •  GLUCOSAMINE HCL PO, Take 1 tablet by mouth daily in the early morning., Disp: , Rfl:   •  ipratropium (ATROVENT) 0.02 % nebulizer solution, INHALE THE CONTENTS OF 1 VIAL VIA NEBULIZER THREE TIMES DAILY, Disp: , Rfl:   •  Lotemax SM 0.38 % GEL, INSTILL 1 DROP IN THE LEFT EYE ONCE DAILY, Disp: , Rfl:   •  Multiple Vitamins-Minerals (PRESERVISION AREDS 2 PO), Take by mouth 2 (two) times a day, Disp: , Rfl:   •  potassium chloride (K-DUR,KLOR-CON) 20 mEq tablet, Take 20 mEq by mouth daily, Disp: , Rfl:   •  rosuvastatin (CRESTOR) 20 MG tablet, Take 20 mg by mouth daily at bedtime , Disp: , Rfl:   •  amoxicillin (AMOXIL) 500 mg capsule, TAKE 4 CAPSULES BY MOUTH ONE HOUR PRIOR TO APPOINTMENT (Patient not taking: No sig reported), Disp: , Rfl:   •  benzonatate (TESSALON) 200 MG capsule, Take 1 capsule (200 mg total) by mouth 3 (three) times a day (Patient not taking: Reported on 1/17/2023), Disp: 60 capsule, Rfl: 1  •  Brinzolamide-Brimonidine 1-0.2 % SUSP, Apply to eye 2 (two) times a day Left eye only (Patient not taking: Reported on 1/17/2023), Disp: , Rfl:   •  Clobetasol Prop Emollient Base 0.05 % emollient cream, Apply topically 2 (two) times a day (Patient not taking: Reported on 1/17/2023), Disp: 30 g, Rfl: 0  •  hydrochlorothiazide (HYDRODIURIL) 25 mg tablet, Take 25 mg by mouth daily (Patient not taking: Reported on 1/17/2023), Disp: , Rfl:   •  Netarsudil Dimesylate 0.02 % SOLN, Apply to eye (Patient not taking: Reported on 1/17/2023), Disp: , Rfl:   •  omeprazole (PriLOSEC) 40 MG capsule, Take 1 capsule (40 mg total) by mouth daily (Patient not taking: Reported on 1/20/2022 ), Disp: 30 capsule, Rfl: 0  •  predniSONE 5 mg tablet, Two tablets daily (Patient not taking: Reported on 7/27/2022), Disp: 60 tablet, Rfl: 1  •  triamcinolone (KENALOG) 0.1 % cream, Apply topically to affected areas twice a day (Patient not taking: Reported on 1/17/2023), Disp: 453.6 g, Rfl: 0  •  triamcinolone (KENALOG) 0.1 % ointment, Apply topically to the affected areas 2x/day. Do not apply on face and groin. (Patient not taking: Reported on 1/17/2023), Disp: 454 g, Rfl: 1          Whom besides the patient is providing clinical information about today's encounter? Parent/Guardian provided history (due to age/developmental stage of patient)    Physical Exam and Assessment/Plan by Diagnosis:    ATOPIC DERMATITIS (SIGNIFICANTLY IMPROVED) FOLLOW UP     Physical Exam:  Anatomic Location Affected:  Trunk and extremities  Morphological Description:  Clear  Body Surface Area Today:  0     Additional History of Present Condition:  Patient is currently on Dupixent 300 mg subcutaneous every two weeks and per wife he is "clear". PA is good until 3/25/2023. Patien has tried to stop and flares.  Patient had severe atopic dermatitis in the past. He has a history of active bladder cancer so dupixent is a wonderful option for him.     Assessment and Plan:  Based on a thorough discussion of this condition and the management approach to it (including a comprehensive discussion of the known risks, side effects and potential benefits of treatment), the patient (family) agrees to implement the following specific plan:     Continue Dupixent 300 mg every 2 weeks subcutaneously  Follow-up in clinic in 6 months to make sure prior authorization has been performed and patient is not flaring if   there is delay in obtaining continuation for dupixent. Obtain new PA for Dupixent -patient has 6 needles left                Assessment and Plan:       Atopic Dermatitis is a chronic, itchy skin condition that is very common in children but may occur at any age. It is also known as “eczema” or “atopic eczema.” It is the most common form of dermatitis.     Atopic dermatitis usually occurs in people who have an “atopic tendency.”  This means they may develop any or all of these closely linked conditions: Atopic dermatitis, asthma, hay fever (allergic rhinitis), eosinophilic esophagitis, and gastroenteritis. Often these conditions run within families with a parent, child or sibling also affected. A family history of asthma, eczema or hay fever is particularly useful in diagnosing atopic dermatitis in infants.     Atopic dermatitis arises because of a complex interaction of genetic and environmental factors. These include defects in skin barrier function making the skin more susceptible to irritation by soap and other contact irritants, the weather, temperature and non-specific triggers. There is also an element of immune system dysregulation that is often present. By definition, it is chronic and has a "waxing-waning" nature; flares should be expected but with good education and treatment strategies can be minimized.     Some specific tips we discussed:  Dry skin care. Using only mild cleansers (hypoallergenic and without fragrances) and fragrance free detergent (not “unscented” products which contain a masking agent); we discussed avoiding irritants/fragranced products.   The importance of regular application of moisturizers daily (at least 3 times a day)  The known and theoretical side effects of steroids at length, including but not limited to atrophy of skin and increased pressure in eye (glaucoma) and clouding of the eye's lens (cataracts) if used in or around the eye for extended durations. The specific over-the-counter interventions and medications. Side effects, risks and benefits of topical and oral medications discussed. After lengthy discussion of etiology and treatment options, we decided to implement the following personalized treatment plan:        EDUCATION AS INTERVENTION!     WHAT IS ATOPIC DERMATITIS? Atopic dermatitis (also called “eczema”) is a condition of the skin where the skin is dry, red, and itchy. The main function of the skin is to provide a barrier from the environment and is also the first defense of the immune system. In atopic dermatitis the skin barrier is decreased or disrupted, and the skin is easily irritated. As a result, moisture escapes the skin more easily, and environmental allergens and microbes can enter the skin more easily. Consequently, the skin's immune system is altered. If there are increased allergic type cells in the skin, the skin may become red and “hyper-excitable.” This leads to itching and a subsequent rash.     WHY DO PEOPLE GET ATOPIC DERMATITIS? There is no single answer because many factors are involved. It is likely a combination of genetic makeup and environmental triggers and/or exposures. Excessive drying or sweating of the skin, Irritating soaps, dust mites, and pet dander are some of the more common triggers. There is no blood test that can be done to confirm this diagnosis. The history and appearance of the skin is usually sufficient for a diagnosis. However, in some cases if the rash does not fit with the history or respond appropriately to treatment, a skin biopsy may be helpful. Many children do outgrow atopic dermatitis or get better; however, many continue to have sensitive skin into adulthood.   Asthma and hay fever are often seen in many patients with atopic dermatitis; however, asthma flares do not necessarily occur at the same time as skin flares.      PREVENTING FLARES OF ATOPIC DERMATITIS  The first step is to maintain the skin's barrier function. Keep the skin well moisturized. Avoid irritants and triggers. Use prescribed medicine when there are red or rough areas to help the skin to return to normal as quickly as possible. Try to limit scratching.      If you keep the skin well moisturized, and avoid coming in contact with things you know irritate your child's skin, there will be less flares. However, some flares of atopic dermatitis are beyond your control. You should work with your health care provider to come up with a plan that minimizes flares while minimizing long term use of medications that suppress the immune system.           WHAT ARE SOME OF THE TRIGGERS? Triggers are different for different people. The most common triggers are:  Heat and sweat for some individuals, cold weather for others. House dust mites, pet fur. Wool; synthetic fabrics like nylon; dyed fabrics. Tobacco smoke   Fragrances in: shampoos, soaps, lotions, laundry detergents, fabric softeners. Saliva or prolonged exposure to water.     WHAT ABOUT FOOD ALLERGIES? This is a very controversial topic, as many believe that food allergies are responsible for skin flares. In some cases, specific foods may cause worsening of atopic dermatitis; however this occurs in a minority of cases and usually happens within a few hours of ingestion. While food allergy is more common in children with eczema, foods are specific triggers for flares in only a small percentage of children. If you notice that the skin flares after certain foods you can see if eliminating one food at time makes a difference, as long as your child can still enjoy a well-balanced diet. There are blood (RAST) and skin (PRICK) tests that can check for allergies, but they are often positive in children who are not truly allergic.  Therefore it is important that you work with your allergist and dermatologist to determine which foods are relevant and causing true symptoms. Extreme food elimination diets without the guidance of your doctor, which have become more popular in recent years, may even result in worsening of the skin rash due to malnutrition and avoidance of essential nutrients.     TREATMENT  Treatments are aimed at minimizing exposure to irritating factors and decreasing  the skin inflammation which results in an itchy rash. There are many different treatment options, which depend on your child's rash, its location, and severity. Topical treatments include corticosteroids and steroid-like creams such as Protopic, Elidel, and Eucrisa, which are believed to not thin the skin. Please read the discussions below regarding risks and benefits of all of these creams.     Occasionally bacterial or viral infections can occur which flare the skin and require oral and/or topical antibiotics or antivirals. In some cases bleach baths 2-3 times weekly can be helpful to prevent recurrent infection.     For severe disease, strong oral medications such as corticosteroids, methotrexate or azathioprine (Imuran) may be needed. These medications require close monitoring and follow-up. You should discuss the risks/ benefits/alternatives of these medications with your health care provider to come up with the best treatment plan for your child.    1) Use moisturizer all over the entire body at least THREE TIMES a day. This keeps the skin moisturized to restore the barrier function. Find a cream or ointment that your child likes - this is the most important. The medicines do not work in the bottle. The thicker the moisturizer, generally the better barrier it provides. Ointments often moisturize better than creams; and creams work better than lotions. Lotions are more useful during the summer when thick greasy ointments are uncomfortable. If you put moisturizer on the skin after bathing, while the skin is damp, it is twice as effective.   The moisturizer provides a seal holding the water in the skin. You may bathe your child in warm - not hot - water, for short periods of time (no more than 5-10 minutes at a time) once a day if they like. Lightly pat your child dry with a towel and, while the skin is still damp, (within 3 minutes) apply a moisturizer from head to toe. If your child is using a medicated cream, apply it and allow it to absorb completely BEFORE you apply the moisturizer.    2) Apply the prescription medication TWICE A DAY to only the red, rough areas on the skin OR AS [address] PROVIDER  Put the medication on your fingers and gently rub it into the areas. Usually the medicine will help an area within a few days time. Try to put the medicine on for two days after you have noticed that the redness is no longer present; this will help the redness from returning. The severity of the rash and the strength and usage of the medication will determine how quickly you see improvement.     It is important that you do not overuse steroid creams, and if you notice a thin, shiny appearance to the skin or broken blood vessels, you should stop using the cream and consult your health care provider regarding possible overuse/overthinning of the skin. The face, armpits and groin have particularly thin and sensitive skin and are therefore most at risk for bad results if steroids are over-used in these sites.    3) Avoid triggers. Some children have specific things that trigger itching and rashes, while others may have none that can be identified. It may require a little bit of trial and error to see what applies to your child. Also, triggers can change over time for your child. The most common triggers are listed above; start with these. Avoid the use of fabric softeners in the washing machine or dryer sheets (unless they are fragrance-free). Try to use laundry detergents, soaps and shampoos that are fragrance-free.  You may find it helpful to double-rinse your clothes. Some children are sensitive to house dust mites and they may benefit from a plastic mattress wrap. While food allergy is more common in children with eczema, foods are specific triggers for flares in only a small percentage of children. If you notice that the skin flares after certain foods you can see if eliminating one food at time makes a difference, as long as your child can still enjoy a well-balanced diet.    4) Consider using a medication like an anti-histamine by mouth to help control the itching. Scratching only makes the skin more reactive and the barrier function even more disrupted. It can cause both children and their parents to lose sleep! There are different types of anti-itch medications. Some cause more drowsiness than others. Both types are acceptable depending on your child and your preference. Start with Benadryl and if that does not work, ask for a prescription “antihistamine.”     5) About the prescription creams:  Corticosteroid creams and ointments (generally things with "-one" or "tita" on the end of their names): The strength of the cream or ointment depends on the name of the active ingredient. The numbers at the end do not indicate the relative strength. Thus triamcinolone 0.1% ointment, considered a mid-strength corticosteroid, is much stronger than hydrocortisone 1% even though the number following the name is much lower. Topical corticosteroids are very effective in treating atopic dermatitis. When used in the manner prescribed (to rashy areas of skin and for no more than a few weeks at a time to any one area) they are very safe. These are corticosteroids and are anti-inflammatory, not the “anabolic steroids” like those used illegally by some athletes.      Topical non-steroid creams and ointments (immunomodulators): These creams and ointments are also called topical calcineurin inhibitors (TCIs).   These include Protopic ointment and Elidel cream. Crisaborole 2% Pedro Pino) is a prescription ointment that targets an enzyme called PDE4 (phosphodiesterase 4). It is used on the skin topically to treat mild-to-moderate eczema in adults and children 3years of age and older. In total, these nonsteroidal prescriptions are used to help decrease itching and redness in the skin. They are not as strong as most steroid creams; however, it is believed that they do not thin the skin when overused. They are generally used as second-line medications, though they may be used alone or in conjunction with topical steroids. In sensitive areas such as the face, underarms or groin, they are often recommended. They can sting inflamed skin, but are generally well tolerated once the skin is healing.     The FDA placed a “black-box” warning on both Elidel and Protopic in 2006 based on animal studies using the medications. Some animals developed skin cancer and lymphoma. Subsequently, the FDA released a statement that there is no causal relationship between the two medications and cancer. Because of this concern, there are ongoing studies to evaluate this relationship in humans. So far, there are studies that support the safety of these medications. One showed that the rates of cancer in patient using these medications topically were less than the rates of the general population and another showed that in patient's using the medication over a large area of the body, the levels of the medication in the blood was undetectable.     As for Eucrisa, this product is only approved for the topical treatment of mild-to-moderate eczema in patients 3years of age and older; use of the medication in kids younger than 2 is considered “off label” and has not been formally studied. Burning and stinging are the most commonly reported side effects of this medication.   Rarely, this product has been known to cause hives and hypersensitivity reactions; discontinue its use if you develop severe itching, swelling, or redness in the area of application.       Scribe Attestation    I,:  Carmen De Leon am acting as a scribe while in the presence of the attending physician.:       I,:  Rodríguez Correia MD personally performed the services described in this documentation    as scribed in my presence.:

## 2023-08-24 ENCOUNTER — TELEPHONE (OUTPATIENT)
Dept: DERMATOLOGY | Facility: CLINIC | Age: 88
End: 2023-08-24

## 2023-08-24 DIAGNOSIS — L20.9 ATOPIC DERMATITIS, UNSPECIFIED TYPE: Primary | ICD-10-CM

## 2023-08-24 NOTE — TELEPHONE ENCOUNTER
Prior authorization was submitted to insurance through [employer] for new start Dupixent 300 mg.  Key#[id number]

## 2023-08-28 RX ORDER — DUPILUMAB 300 MG/2ML
INJECTION, SOLUTION SUBCUTANEOUS
Qty: 8 ML | Refills: 0 | Status: SHIPPED | OUTPATIENT
Start: 2023-08-28

## 2023-08-28 RX ORDER — DUPILUMAB 300 MG/2ML
INJECTION, SOLUTION SUBCUTANEOUS
Qty: 4 ML | Refills: 10 | Status: SHIPPED | OUTPATIENT
Start: 2023-08-28

## 2023-08-28 NOTE — TELEPHONE ENCOUNTER
Received dismissal for Dupixent, Patient's insurance dose not require a prior authorization. Form was uploaded into patients chart.

## 2023-08-31 ENCOUNTER — TELEPHONE (OUTPATIENT)
Dept: DERMATOLOGY | Facility: CLINIC | Age: 88
End: 2023-08-31

## 2023-08-31 NOTE — TELEPHONE ENCOUNTER
Received fax from [address] [place] [address]. Requesting clarification for Dupixent Pen. Scanned into chart for completion.

## 2023-09-06 ENCOUNTER — FOLLOW UP (OUTPATIENT)
Dept: URBAN - METROPOLITAN AREA CLINIC 6 | Facility: CLINIC | Age: 88
End: 2023-09-06

## 2023-09-06 DIAGNOSIS — Z96.1: ICD-10-CM

## 2023-09-06 DIAGNOSIS — H18.512: ICD-10-CM

## 2023-09-06 DIAGNOSIS — H40.1122: ICD-10-CM

## 2023-09-06 PROCEDURE — 92012 INTRM OPH EXAM EST PATIENT: CPT

## 2023-09-06 ASSESSMENT — TONOMETRY: OS_IOP_MMHG: 14

## 2023-09-06 ASSESSMENT — VISUAL ACUITY: OS_CC: 20/70

## 2023-09-08 ENCOUNTER — FOLLOW UP (OUTPATIENT)
Dept: URBAN - METROPOLITAN AREA CLINIC 6 | Facility: CLINIC | Age: 88
End: 2023-09-08

## 2023-09-08 DIAGNOSIS — H18.512: ICD-10-CM

## 2023-09-08 DIAGNOSIS — H40.1122: ICD-10-CM

## 2023-09-08 DIAGNOSIS — H44.521: ICD-10-CM

## 2023-09-08 DIAGNOSIS — Z96.1: ICD-10-CM

## 2023-09-08 DIAGNOSIS — H35.3221: ICD-10-CM

## 2023-09-08 PROCEDURE — PFS EYLEA PFS: Mod: JZ

## 2023-09-08 PROCEDURE — 67028 INJECTION EYE DRUG: CPT

## 2023-09-08 PROCEDURE — 92134 CPTRZ OPH DX IMG PST SGM RTA: CPT

## 2023-09-08 PROCEDURE — 92202 OPSCPY EXTND ON/MAC DRAW: CPT | Mod: NC

## 2023-09-08 PROCEDURE — 92014 COMPRE OPH EXAM EST PT 1/>: CPT | Mod: 25

## 2023-09-08 ASSESSMENT — TONOMETRY: OS_IOP_MMHG: 7

## 2023-09-08 ASSESSMENT — VISUAL ACUITY: OS_CC: 20/40+2

## 2023-10-06 ENCOUNTER — FOLLOW UP (OUTPATIENT)
Dept: URBAN - METROPOLITAN AREA CLINIC 6 | Facility: CLINIC | Age: 88
End: 2023-10-06

## 2023-10-06 DIAGNOSIS — H35.3221: ICD-10-CM

## 2023-10-06 PROCEDURE — 92134 CPTRZ OPH DX IMG PST SGM RTA: CPT

## 2023-10-06 PROCEDURE — 92202 OPSCPY EXTND ON/MAC DRAW: CPT | Mod: NC

## 2023-10-06 PROCEDURE — PFS EYLEA PFS: Mod: JZ

## 2023-10-06 PROCEDURE — 67028 INJECTION EYE DRUG: CPT

## 2023-10-06 PROCEDURE — 92014 COMPRE OPH EXAM EST PT 1/>: CPT | Mod: 25

## 2023-10-06 ASSESSMENT — TONOMETRY: OS_IOP_MMHG: 6

## 2023-10-06 ASSESSMENT — VISUAL ACUITY: OS_CC: 20/40-2

## 2023-10-06 NOTE — TELEPHONE ENCOUNTER
I called patient to inform the wife that Yi Flanagan has been approved and that the insurance will reach out to them to let them know  Patients wife also mentioned on the phone that they are low on Triamcinolone ointment and would like a refill 
Yes

## 2023-10-31 NOTE — TELEPHONE ENCOUNTER
Cassius Pruett from [facility] called to clarify prescription for dupixent injection  Patient experience pain with prefilled syringes  Would like pens instead     Dr Irene Medina can you update prescription to pens instead of syringes   Send script or call [facility]     Call back   [phone]   Ref ID# [id number] Thank you

## 2023-11-10 ENCOUNTER — FOLLOW UP (OUTPATIENT)
Dept: URBAN - METROPOLITAN AREA CLINIC 6 | Facility: CLINIC | Age: 88
End: 2023-11-10

## 2023-11-10 DIAGNOSIS — H35.3221: ICD-10-CM

## 2023-11-10 PROCEDURE — 92134 CPTRZ OPH DX IMG PST SGM RTA: CPT

## 2023-11-10 PROCEDURE — 67028 INJECTION EYE DRUG: CPT

## 2023-11-10 PROCEDURE — 92202 OPSCPY EXTND ON/MAC DRAW: CPT | Mod: NC

## 2023-11-10 PROCEDURE — PFS EYLEA PFS: Mod: JZ

## 2023-11-10 PROCEDURE — 92014 COMPRE OPH EXAM EST PT 1/>: CPT | Mod: 25

## 2023-11-10 ASSESSMENT — TONOMETRY: OS_IOP_MMHG: 9

## 2023-11-10 ASSESSMENT — VISUAL ACUITY: OS_SC: 20/40

## 2023-12-15 ENCOUNTER — FOLLOW UP (OUTPATIENT)
Dept: URBAN - METROPOLITAN AREA CLINIC 6 | Facility: CLINIC | Age: 88
End: 2023-12-15

## 2023-12-15 DIAGNOSIS — H35.3221: ICD-10-CM

## 2023-12-15 PROCEDURE — 92014 COMPRE OPH EXAM EST PT 1/>: CPT | Mod: 25

## 2023-12-15 PROCEDURE — HD EYLEA HD 8MG: Mod: JZ

## 2023-12-15 PROCEDURE — 92134 CPTRZ OPH DX IMG PST SGM RTA: CPT

## 2023-12-15 PROCEDURE — 67028 INJECTION EYE DRUG: CPT

## 2023-12-15 PROCEDURE — 92202 OPSCPY EXTND ON/MAC DRAW: CPT | Mod: NC

## 2023-12-15 ASSESSMENT — TONOMETRY: OS_IOP_MMHG: 5

## 2023-12-15 ASSESSMENT — VISUAL ACUITY
OS_PH: 20/50
OS_CC: 20/60

## 2024-01-18 ENCOUNTER — TELEPHONE (OUTPATIENT)
Age: 89
End: 2024-01-18

## 2024-01-18 NOTE — TELEPHONE ENCOUNTER
Rec'd call form patients wife stating that we will be receiving prior auth request for patients Dupixent (looks like we already have - under media).    She wanted to let Dr. Fitzpatrick know that she's been using it every 3 weeks instead of every 2 ..... So when new script is sent, it could reflect that.    Any questions/concerns, please return call to patients wife.

## 2024-01-18 NOTE — LETTER
ATTN: Appeals Department     Patient: Burak Garcias :1930 Member ID:[id number]    Re: Request for Reconsideration of Dupixent 300mg pen      To Whom It May Concern:   Burak Garcias 1930 was denied coverage of Dupixent 300mg pen on 2024. The denial reason was stated as not covered due     Patient was seen on *** during this visit {He/she (caps):92787} was put on (previous medication). Patient had follow up on *** for {Desc; his/her:40425}*** month evaluation and was discussed ***. During that visit his treatment plan ***     In conclusion, please reconsider the denial (medication) for my patient. I would appreciate prompt review of this appeal and approval of this FDA-approved medication. I can be reached at [phone] for additional information and discussion. Thank you.      Sincerely,

## 2024-01-19 NOTE — TELEPHONE ENCOUNTER
PA for Dupixent 300mg pen    Submitted via  [x]CMM-KEY [id number]  []Surescripts-Case ID #   []Faxed to plan   []Other website   []Phone call Case ID #     Office notes sent, clinical questions answered. Awaiting determination

## 2024-01-25 NOTE — TELEPHONE ENCOUNTER
PA for Dupixent 300mg pen Denied    Reason:Denial letter scanned into chart was too long to screen shot into message.     Message sent to office clinical pool Yes    Denial letter scanned into Media Yes    Appeal started No Waiting for providers response.

## 2024-01-25 NOTE — TELEPHONE ENCOUNTER
Kelsey when his rash flared years ago we had to use immunosuppressives. ( Tracrolimus)   If I need to document more I believe I will need to see to update his need for dupixent.  If there are no slots available perhaps we will need to see with JARETH Mistry and myself.  She has openings.

## 2024-01-26 NOTE — TELEPHONE ENCOUNTER
Patient has been scheduled for 2/6 at  with Fidelia. Spoke to spouse of pt and she states that Burak is currently on hospice, and that the [facility] would be best for them. I let the spouse of pt know that I could arrange a lyft ride to  and drop them off for the  appt, but that I would also make you aware of our conversation. Please let me know your thoughts and if you would like me to change anything, thank you!

## 2024-01-26 NOTE — TELEPHONE ENCOUNTER
So I am on ambassador week of Feb 12.  Couldn't we see him tues, thurs, or Friday of ambassador week

## 2024-02-02 ENCOUNTER — ESTABLISHED COMPREHENSIVE EXAM (OUTPATIENT)
Dept: URBAN - METROPOLITAN AREA CLINIC 6 | Facility: CLINIC | Age: 89
End: 2024-02-02

## 2024-02-02 DIAGNOSIS — H35.3221: ICD-10-CM

## 2024-02-02 PROCEDURE — 92134 CPTRZ OPH DX IMG PST SGM RTA: CPT

## 2024-02-02 PROCEDURE — HD EYLEA HD 8MG: Mod: JZ

## 2024-02-02 PROCEDURE — 67028 INJECTION EYE DRUG: CPT

## 2024-02-02 PROCEDURE — 92202 OPSCPY EXTND ON/MAC DRAW: CPT | Mod: 59

## 2024-02-02 PROCEDURE — 92014 COMPRE OPH EXAM EST PT 1/>: CPT | Mod: 25

## 2024-02-02 ASSESSMENT — VISUAL ACUITY
OS_PH: 20/40
OS_CC: 20/50

## 2024-02-02 ASSESSMENT — TONOMETRY: OS_IOP_MMHG: 7

## 2024-02-12 ENCOUNTER — TELEPHONE (OUTPATIENT)
Age: 89
End: 2024-02-12

## 2024-02-12 NOTE — TELEPHONE ENCOUNTER
Patients wife called stating the patient has an appointment tomorrow and he is on hospice she doesn't know if she will be able to get him up and out for the appointment, she was wondering if at all possible could it be switched to a virtual? Call back number [phone]

## 2024-02-13 ENCOUNTER — TELEMEDICINE (OUTPATIENT)
Dept: DERMATOLOGY | Facility: CLINIC | Age: 89
End: 2024-02-13
Payer: COMMERCIAL

## 2024-02-13 ENCOUNTER — NURSE TRIAGE (OUTPATIENT)
Age: 89
End: 2024-02-13

## 2024-02-13 VITALS — HEIGHT: 68 IN | WEIGHT: 175 LBS | BODY MASS INDEX: 26.52 KG/M2

## 2024-02-13 DIAGNOSIS — L20.9 ATOPIC DERMATITIS, UNSPECIFIED TYPE: Primary | ICD-10-CM

## 2024-02-13 PROCEDURE — 99213 OFFICE O/P EST LOW 20 MIN: CPT | Performed by: DERMATOLOGY

## 2024-02-13 NOTE — LETTER
2024     ATTN: [employer] Appeals Department     Patient: Burak Garcias :1930 Member ID:[id number]    Re: Request for Reconsideration of Dupixent 300mg pen       To Whom It May Concern:   Burak Garcias 1930 was denied coverage of Dupixent 300mg pen on 2024. The denial reason was stated as patient not being medically necessary due to not meeting insurance criteria. We are asking for reconsideration due to patient having tried and failed topical creams in the past as well as oral immunosuppressant and showed no improvement. Patient has been on the medication for 3 years and has shown significant improvement while on the medication.     In conclusion, please reconsider the denial of Dupixent 300mg pen for my patient. I would appreciate prompt review of this appeal and approval of this FDA-approved medication. I can be reached at [phone] for additional information and discussion. Thank you.      Sincerely,      Emma Phelan MD.

## 2024-02-13 NOTE — TELEPHONE ENCOUNTER
Looks like I'm not on for virtuals tomorrow...taking PTO.  Could we assign to provider doing virutal tomorrow or another day for virtuals with me.

## 2024-02-13 NOTE — PROGRESS NOTES
Virtual Regular Visit    Verification of patient location:    Patient is located at Home in the following state in which I hold an active license [place]      Assessment/Plan:    Problem List Items Addressed This Visit    None           Reason for visit is   Chief Complaint   Patient presents with    Virtual Regular Visit          Encounter provider Emma Phelan MD    Provider located at [address] [place] [address] [phone]      Recent Visits  No visits were found meeting these conditions.  Showing recent visits within past 7 days and meeting all other requirements  Today's Visits  Date Type Provider Dept   02/13/24 Telemedicine Emma Phelan MD [facility]   Showing today's visits and meeting all other requirements  Future Appointments  No visits were found meeting these conditions.  Showing future appointments within next 150 days and meeting all other requirements       The patient was identified by name and date of birth. Burak Garcias was informed that this is a telemedicine visit and that the visit is being conducted through the Epic Embedded platform. He agrees to proceed..  My office door was closed. No one else was in the room.  He acknowledged consent and understanding of privacy and security of the video platform. The patient has agreed to participate and understands they can discontinue the visit at any time.    Patient is aware this is a billable service.     Subjective  Burak Garcias is a 89 y.o. male Atopic Dermatitis .      HPI     Past Medical History:   Diagnosis Date    Arthritis     Bladder cancer (HCC)     also has prostate ca, skin ca    Blind right eye     S/P cataract surgery-2/15    Cancer (HCC)     Bladder, prostate and skin    Colon polyp     COPD (chronic obstructive pulmonary disease) (HCC)     Coronary artery disease     CABG, Right carotid endarterectomy    Edema     bilateral  ankles-minimal    Eye problem     developed post op complicationsafter cataract extraction 2/2016-had 2 procedures in [place] but not able to save the vision, uses  an eye patch at night         ight    Glaucoma     left eye    Hearing difficulty of both ears     bilateral hearing aids    History of transfusion 11/2016    Hyperlipidemia     Hypertension     Macular degeneration     left eye    Myocardial infarction (HCC) 2003    CABG x 4    Polymyalgia (HCC)     history of x 2    Rotator cuff dysfunction, left     Skin cancer     Sleep difficulties     Squamous cell skin cancer 06/11/2021    Left elbow       Past Surgical History:   Procedure Laterality Date    ACHILLES TENDON REPAIR Left     BLADDER INSTILLATION      numerous procedures    CARDIAC SURGERY  2003    CABG x 4    CARDIAC SURGERY  01/01/2003    Bypass    CAROTID ENDARTARECTOMY Right 2001    CATARACT EXTRACTION      aliyah w/ IOL    COLONOSCOPY      CYSTOSCOPY      EYE SURGERY Right     x2 -S/P cataract surgery- now blind in the right eye    EYE SURGERY      drain in left eye  3/2020    FL RETROGRADE PYELOGRAM  6/19/2020    FRACTURE SURGERY Right 01/2014    wrist    HEMORRHOID SURGERY      JOINT REPLACEMENT Right 11/2016    knee    KNEE ARTHROPLASTY Right 11/2017    MOHS SURGERY      Nose    VA BLADDER INSTILLATION ANTICARCINOGENIC AGENT N/A 7/25/2017    Procedure: INSTILLATION EPIRUBICIN;  Surgeon: Jef Paniagua MD;  Location: [facility];  Service: Urology    VA BLDR IRRIGATION SMPL LAVAGE &/INSTLJ N/A 8/3/2018    Procedure: EPIRUBICIN INSTILLATION;  Surgeon: Jef Paniagua MD;  Location: [facility];  Service: Urology    VA CYSTOURETHROSCOPY W/DEST &/RMVL MED BLADDER REBECCA N/A 7/25/2017    Procedure: TRANSURETHRAL RESECTION OF BLADDER TUMOR (TURBT), BLADDER INSTILLATION;  Surgeon: Jef Paniagua MD;  Location: [facility];  Service: Urology    VA CYSTOURETHROSCOPY W/DEST &/RMVL MED BLADDER REBECCA N/A 8/3/2018    Procedure: CYSTOSCOPY; TURBT;   Surgeon: Jef Paniagua MD;  Location: [facility];  Service: Urology    [place] CYSTOURETHROSCOPY W/DEST &/RMVL MED BLADDER REBECCA N/A 7/30/2019    Procedure: TRANSURETHRAL RESECTION OF BLADDER TUMOR (TURBT), INSTALLATION OF GEMCITABINE;  Surgeon: Jef Paniagua MD;  Location: [facility];  Service: Urology    [place] CYSTOURETHROSCOPY W/DEST &/RMVL MED BLADDER REBECCA Bilateral 6/19/2020    Procedure: CYSTOSCOPY, TRANSURETHRAL RESECTION OF BLADDER TUMOR (TURBT), INTRAVESICAL GEMCITABINE INSTILLATION, RETROGRADES;  Surgeon: Jef Paniagua MD;  Location: [facility];  Service: Urology    [place] XCAPSL CTRC RMVL INSJ IO LENS PROSTH W/O ECP Right 2/16/2016    Procedure: EXTRACTION EXTRACAPSULAR CATARACT PHACO INTRAOCULAR LENS (IOL);  Surgeon: Yadiel Grady MD;  Location: [facility] MAIN OR;  Service: Ophthalmology    [place] XCAPSL CTRC RMVL INSJ IO LENS PROSTH W/O ECP Left 1/26/2016    Procedure: EXTRACTION INTRACAPSULAR CATARACT PHACO INTRAOCULAR LENS (IOL);  Surgeon: Yadiel Grady MD;  Location: [facility] MA[facility] OR;  Service: Ophthalmology    REPLACEMENT TOTAL KNEE Right 11/15/2017    SKIN BIOPSY      face    TONSILLECTOMY      age 9    TRANSURETHRAL RESECTION OF BLADDER TUMOR      TRANSURETHRAL RESECTION OF BLADDER TUMOR N/A 1/31/2017    Procedure: TRANSURETHRAL RESECTION OF URETHRAL LESIONS /  BLADDER TUMOR W/ HOLMIUM LASER, AND URETHRAL TUMOR AND INSTILLATION OF ELLANCE;  Surgeon: Jef Paniagua MD;  Location: [facility];  Service:     TRANSURETHRAL RESECTION OF BLADDER TUMOR Bilateral 7/12/2016    Procedure: TRANSURETHRAL RESECTION OF BLADDER TUMOR (TURBT), Cysto, Retrogrades;  Surgeon: Jef Paniagua MD;  Location: [facility];  Service:     TRANSURETHRAL RESECTION OF PROSTATE  02/28/2013    [place] 6 (3+3) involving <5%    WRIST FRACTURE SURGERY Right     has hardware    WRIST FRACTURE SURGERY Right     orif of right wrist - still has hardware in place from 1/2014       Current Outpatient Medications   Medication Sig  Dispense Refill    albuterol (2.5 mg/3 mL) 0.083 % nebulizer solution Inhale 2.5 mg every 4 (four) hours as needed      albuterol (PROVENTIL HFA,VENTOLIN HFA) 90 mcg/act inhaler Inhale 2 puffs every 6 (six) hours as needed      amLODIPine (NORVASC) 2.5 mg tablet Take 2.5 mg by mouth 2 (two) times a day       azelastine (ASTELIN) 0.1 % nasal spray 2 sprays into each nostril 2 (two) times a day      bimatoprost (LUMIGAN) 0.01 % ophthalmic drops Administer 1 drop into the left eye daily at bedtime      Dupilumab (Dupixent) 300 MG/2ML SOPN INJECT 1 PEN (300 MG) UNDER THE SKIN EVERY 2 WEEKS 8 mL 2    Dupilumab (Dupixent) 300 MG/2ML SOPN Inject 300 mg on week 6 then every other week there after for maintenance dose. 4 mL 10    furosemide (LASIX) 20 mg tablet Take 20 mg by mouth daily      GLUCOSAMINE HCL PO Take 1 tablet by mouth daily in the early morning.      ipratropium (ATROVENT) 0.02 % nebulizer solution INHALE THE CONTENTS OF 1 VIAL VIA NEBULIZER THREE TIMES DAILY      Lotemax SM 0.38 % GEL INSTILL 1 DROP IN THE LEFT EYE ONCE DAILY      Multiple Vitamins-Minerals (PRESERVISION AREDS 2 PO) Take by mouth 2 (two) times a day      potassium chloride (K-DUR,KLOR-CON) 20 mEq tablet Take 20 mEq by mouth daily      amoxicillin (AMOXIL) 500 mg capsule TAKE 4 CAPSULES BY MOUTH ONE HOUR PRIOR TO APPOINTMENT (Patient not taking: No sig reported)      aspirin 81 MG tablet Take 81 mg by mouth daily in the early morning Last dose 6/12/20 (Patient not taking: Reported on 2/13/2024)      benzonatate (TESSALON) 200 MG capsule Take 1 capsule (200 mg total) by mouth 3 (three) times a day (Patient not taking: Reported on 1/17/2023) 60 capsule 1    Brinzolamide-Brimonidine 1-0.2 % SUSP Apply to eye 2 (two) times a day Left eye only (Patient not taking: Reported on 1/17/2023)      cholecalciferol (VITAMIN D3) 1,000 units tablet Take 1,000 Units by mouth daily (Patient not taking: Reported on 2/13/2024)      Clobetasol Prop Emollient Base  "0.05 % emollient cream Apply topically 2 (two) times a day (Patient not taking: Reported on 1/17/2023) 30 g 0    Dupilumab (Dupixent) 300 MG/2ML SOPN Inject 600 mg on week 0 for loading dose, then 300 me on week 3 and 4 for maintenance dose. (Patient not taking: Reported on 2/13/2024) 8 mL 0    hydrochlorothiazide (HYDRODIURIL) 25 mg tablet Take 25 mg by mouth daily (Patient not taking: Reported on 1/17/2023)      Netarsudil Dimesylate 0.02 % SOLN Apply to eye (Patient not taking: Reported on 1/17/2023)      omeprazole (PriLOSEC) 40 MG capsule Take 1 capsule (40 mg total) by mouth daily (Patient not taking: Reported on 1/20/2022 ) 30 capsule 0    predniSONE 5 mg tablet Two tablets daily (Patient not taking: Reported on 7/27/2022) 60 tablet 1    rosuvastatin (CRESTOR) 20 MG tablet Take 20 mg by mouth daily at bedtime  (Patient not taking: Reported on 2/13/2024)      triamcinolone (KENALOG) 0.1 % cream Apply topically to affected areas twice a day (Patient not taking: Reported on 1/17/2023) 453.6 g 0    triamcinolone (KENALOG) 0.1 % ointment Apply topically to the affected areas 2x/day. Do not apply on face and groin. (Patient not taking: Reported on 1/17/2023) 454 g 1     No current facility-administered medications for this visit.        Allergies   Allergen Reactions    Bcg Live Other (See Comments)     Shakes, became very ill    Timolol Shortness Of Breath    Mitomycin Rash     Chronic rash which lasted for months    Xalatan [Latanoprost]      Back ache-severe    Brinzolamide-Brimonidine GI Intolerance    Carboxymethylcellulose     Dorzolamide GI Intolerance    Lipitor [Atorvastatin] GI Intolerance     Stomach pain    Other Rash     Cat Scan Dye       Review of Systems    Video Exam    Vitals:    02/13/24 1058   Weight: 79.4 kg (175 lb)   Height: 5' 7.5\" (1.715 m)       Physical Exam     Visit Time  Total Visit Duration: 15    [facility] Note     Patient Name: Burak Garcias  Encounter Date: " "2/13/24     Have you been cared for by a [facility] Dermatologist in the last 3 years and, if so, which description applies to you?    Yes.  I have been here within the last 3 years, and my medical history has NOT changed since that time.  I am MALE/not capable of bearing children.    REVIEW OF SYSTEMS:  Have you recently had or currently have any of the following? No changes in my recent health.   PAST MEDICAL HISTORY:  Have you personally ever had or currently have any of the following?  If \"YES,\" then please provide more detail. No changes in my medical history.   HISTORY OF IMMUNOSUPPRESSION: Do you have a history of any of the following:  Systemic Immunosuppression such as Diabetes, Biologic or Immunotherapy, Chemotherapy, Organ Transplantation, Bone Marrow Transplantation?  No     Answering \"YES\" requires the addition of the dotphrase \"IMMUNOSUPPRESSED\" as the first diagnosis of the patient's visit.   FAMILY HISTORY:  Any \"first degree relatives\" (parent, brother, sister, or child) with the following?    No changes in my family's known health.   PATIENT EXPERIENCE:    Do you want the Dermatologist to perform a COMPLETE skin exam today including a clinical examination under the \"bra and underwear\" areas?  NO  If necessary, do we have your permission to call and leave a detailed message on your Preferred Phone number that includes your specific medical information?  Yes      Allergies   Allergen Reactions    Bcg Live Other (See Comments)     Shakes, became very ill    Timolol Shortness Of Breath    Mitomycin Rash     Chronic rash which lasted for months    Xalatan [Latanoprost]      Back ache-severe    Brinzolamide-Brimonidine GI Intolerance    Carboxymethylcellulose     Dorzolamide GI Intolerance    Lipitor [Atorvastatin] GI Intolerance     Stomach pain    Other Rash     Cat Scan Dye      Current Outpatient Medications:     albuterol (2.5 mg/3 mL) 0.083 % nebulizer solution, Inhale 2.5 mg every 4 (four) hours " as needed, Disp: , Rfl:     albuterol (PROVENTIL HFA,VENTOLIN HFA) 90 mcg/act inhaler, Inhale 2 puffs every 6 (six) hours as needed, Disp: , Rfl:     amLODIPine (NORVASC) 2.5 mg tablet, Take 2.5 mg by mouth 2 (two) times a day , Disp: , Rfl:     azelastine (ASTELIN) 0.1 % nasal spray, 2 sprays into each nostril 2 (two) times a day, Disp: , Rfl:     bimatoprost (LUMIGAN) 0.01 % ophthalmic drops, Administer 1 drop into the left eye daily at bedtime, Disp: , Rfl:     Dupilumab (Dupixent) 300 MG/2ML SOPN, INJECT 1 PEN (300 MG) UNDER THE SKIN EVERY 2 WEEKS, Disp: 8 mL, Rfl: 2    Dupilumab (Dupixent) 300 MG/2ML SOPN, Inject 300 mg on week 6 then every other week there after for maintenance dose., Disp: 4 mL, Rfl: 10    furosemide (LASIX) 20 mg tablet, Take 20 mg by mouth daily, Disp: , Rfl:     GLUCOSAMINE HCL PO, Take 1 tablet by mouth daily in the early morning., Disp: , Rfl:     ipratropium (ATROVENT) 0.02 % nebulizer solution, INHALE THE CONTENTS OF 1 VIAL VIA NEBULIZER THREE TIMES DAILY, Disp: , Rfl:     Lotemax SM 0.38 % GEL, INSTILL 1 DROP IN THE LEFT EYE ONCE DAILY, Disp: , Rfl:     Multiple Vitamins-Minerals (PRESERVISION AREDS 2 PO), Take by mouth 2 (two) times a day, Disp: , Rfl:     potassium chloride (K-DUR,KLOR-CON) 20 mEq tablet, Take 20 mEq by mouth daily, Disp: , Rfl:     amoxicillin (AMOXIL) 500 mg capsule, TAKE 4 CAPSULES BY MOUTH ONE HOUR PRIOR TO APPOINTMENT (Patient not taking: No sig reported), Disp: , Rfl:     aspirin 81 MG tablet, Take 81 mg by mouth daily in the early morning Last dose 6/12/20 (Patient not taking: Reported on 2/13/2024), Disp: , Rfl:     benzonatate (TESSALON) 200 MG capsule, Take 1 capsule (200 mg total) by mouth 3 (three) times a day (Patient not taking: Reported on 1/17/2023), Disp: 60 capsule, Rfl: 1    Brinzolamide-Brimonidine 1-0.2 % SUSP, Apply to eye 2 (two) times a day Left eye only (Patient not taking: Reported on 1/17/2023), Disp: , Rfl:     cholecalciferol (VITAMIN  D3) 1,000 units tablet, Take 1,000 Units by mouth daily (Patient not taking: Reported on 2/13/2024), Disp: , Rfl:     Clobetasol Prop Emollient Base 0.05 % emollient cream, Apply topically 2 (two) times a day (Patient not taking: Reported on 1/17/2023), Disp: 30 g, Rfl: 0    Dupilumab (Dupixent) 300 MG/2ML SOPN, Inject 600 mg on week 0 for loading dose, then 300 me on week 3 and 4 for maintenance dose. (Patient not taking: Reported on 2/13/2024), Disp: 8 mL, Rfl: 0    hydrochlorothiazide (HYDRODIURIL) 25 mg tablet, Take 25 mg by mouth daily (Patient not taking: Reported on 1/17/2023), Disp: , Rfl:     Netarsudil Dimesylate 0.02 % SOLN, Apply to eye (Patient not taking: Reported on 1/17/2023), Disp: , Rfl:     omeprazole (PriLOSEC) 40 MG capsule, Take 1 capsule (40 mg total) by mouth daily (Patient not taking: Reported on 1/20/2022 ), Disp: 30 capsule, Rfl: 0    predniSONE 5 mg tablet, Two tablets daily (Patient not taking: Reported on 7/27/2022), Disp: 60 tablet, Rfl: 1    rosuvastatin (CRESTOR) 20 MG tablet, Take 20 mg by mouth daily at bedtime  (Patient not taking: Reported on 2/13/2024), Disp: , Rfl:     triamcinolone (KENALOG) 0.1 % cream, Apply topically to affected areas twice a day (Patient not taking: Reported on 1/17/2023), Disp: 453.6 g, Rfl: 0    triamcinolone (KENALOG) 0.1 % ointment, Apply topically to the affected areas 2x/day. Do not apply on face and groin. (Patient not taking: Reported on 1/17/2023), Disp: 454 g, Rfl: 1          Whom besides the patient is providing clinical information about today's encounter?   Spouse/Guardian provided history (patient is a poor historian)    Physical Exam and Assessment/Plan by Diagnosis:    ATOPIC DERMATITIS (IMPROVED)  Physical exam:  Patient is currently clear and not experiencing any flares.     HPI: Patient has been on Dupixent for about 3 years and has been doing well on it. they do the injections every 3 weeks and he flared when they tried to go to ever 4  weeks. Pts insurance is denying renewal for dupixent. Prior to this he had severe full body eczema with extreme itching. Previously treated with sirolimus aswell. They deny any eye symptoms/inflammation, injection or allergic reactions, or facial dermatitis. Due to his malignancy history, Dupixent is a good option since it does not carry the cancer risk as other systemic immunosuppressants do. Unfortunately patient recently started Hospice care for bladder cancer and respiratory failure.    Assessment/Plan:    History consistent with severe atopic dermatitis with impairment of quality of life due to severe itch  Educated patient on atopic dermatitis, including natural progression of disease with expected periods of quiescence and flaring.   Based on a thorough discussion of this condition and the management approach to it (including a comprehensive discussion of the known risks, side effects and potential benefits of treatment), the patient (family) agrees to implement the following specific plan:    Continue Dupixent 300 mg every 3 weeks  Contact us if symptoms begin to flare  We will submit an appeal to insurance for ongoing approval of Dupixent  Follow up in clinic in 6-9 months to ensure PA does not         ALWAYS APPLY ANY PRESCRIBED MEDICINE TO THE SKIN FIRST. THEN APPLY A MOISTURIZER    MAINTENANCE - Apply at least 2 times a day every day to all skin even if skin is “normal,” or without scale/redness. Apply once after bath and once any other time during the day. Best to apply moisturizer to moist skin after bath (do not dry off completely before applying)    Moisturizer: Use one of the following:  Aquaphor ointment   Cetaphil Cream  Vaseline petroleum jelly  Vanicream  Aveeno Eczema Therapy Balm   Eucerin Cream        TIP For the itching/general skin care:  Keep moisturizer in fridge. Cool is soothing!  Initiate short, lukewarm showers with gentle hydrating soaps (eucerin, cerave, vanicream)  Avoid  scratching    Scribe Attestation      I,:  Lolis Norwood am acting as a scribe while in the presence of the attending physician.:       I,:  Emma Phelan MD personally performed the services described in this documentation    as scribed in my presence.:

## 2024-02-13 NOTE — TELEPHONE ENCOUNTER
PA for Dupixent 300mg pen    Submitted via  [x]CMM-KEY [id number]  []Surescripts-Case ID #   []Faxed to plan   []Other website   []Phone call Case ID #     Office notes sent, clinical questions answered. Awaiting determination

## 2024-02-13 NOTE — TELEPHONE ENCOUNTER
PA for Dupixent 300mg pen appealed via     []CMM  []SS  [x]Letter sent to insurance via fax [phone]  []Other site or means     All necessary records sent. Will await determination.

## 2024-02-14 ENCOUNTER — TELEPHONE (OUTPATIENT)
Dept: DERMATOLOGY | Facility: CLINIC | Age: 89
End: 2024-02-14

## 2024-02-16 NOTE — TELEPHONE ENCOUNTER
PA for Dupixent 300mg pen Approved   Date(s) approved 02/15/2024 - 02/15/2025  Case #[id number]    Patient advised by [] [employer] Message                      [x] Phone call - patient's wife was notified.       Pharmacy advised by [x]Fax                                     []Phone call    Approval letter scanned into [employer] Yes

## 2024-03-12 ENCOUNTER — TELEPHONE (OUTPATIENT)
Dept: DERMATOLOGY | Facility: CLINIC | Age: 89
End: 2024-03-12

## 2024-03-12 NOTE — TELEPHONE ENCOUNTER
Spouse of Burak was in office for a visit and said that he passed away on Friday 3/8. She would appreciate a phone call in regards to the dupixent. Thank you.

## (undated) DEVICE — CYSTOSCOPY PACK: Brand: CONVERTORS

## (undated) DEVICE — BULB SYRINGE,IRRIGATION WITH PROTECTIVE CAP: Brand: DOVER

## (undated) DEVICE — RADIOLOGY STERILE LABELS: Brand: CENTURION

## (undated) DEVICE — EVACUATOR BLADDER ELLIK DISP STRL

## (undated) DEVICE — CYSTO TUBING TUR Y IRRIGATION

## (undated) DEVICE — GROUNDING PAD UNIVERSAL SLW

## (undated) DEVICE — TUBING SUCTION 5MM X 12 FT

## (undated) DEVICE — STRAP FOLEY CATH LEG 1545 9

## (undated) DEVICE — GLOVE SRG BIOGEL 7.5

## (undated) DEVICE — LUBRICANT SURGILUBE TUBE 4 OZ  FLIP TOP

## (undated) DEVICE — POV-IOD SOLUTION 4OZ BT

## (undated) DEVICE — BAG URINE DRAINAGE 2000ML ANTI RFLX LF

## (undated) DEVICE — POUCH UR CATCHER STERILE

## (undated) DEVICE — RESECTOSCOPE BALL ELECTRODE 24 FR 27040N/6

## (undated) DEVICE — LASER HOLMIUM FIBER 365 MIC

## (undated) DEVICE — URETERAL CATHETER 5 FR POLLACK

## (undated) DEVICE — SYRINGE 10ML LL CONTROL TOP

## (undated) DEVICE — BAG URINE DRAINAGE 4000ML CONTINUOUS IRR

## (undated) DEVICE — SPECIMEN CONTAINER STERILE PEEL PACK

## (undated) RX ORDER — LOTEPREDNOL ETABONATE 3.8 MG/G: 1 GEL OPHTHALMIC